# Patient Record
Sex: MALE | Race: WHITE | Employment: FULL TIME | URBAN - METROPOLITAN AREA
[De-identification: names, ages, dates, MRNs, and addresses within clinical notes are randomized per-mention and may not be internally consistent; named-entity substitution may affect disease eponyms.]

---

## 2019-01-16 ENCOUNTER — APPOINTMENT (OUTPATIENT)
Dept: CT IMAGING | Facility: CLINIC | Age: 49
End: 2019-01-16
Payer: COMMERCIAL

## 2019-01-16 ENCOUNTER — HOSPITAL ENCOUNTER (EMERGENCY)
Facility: CLINIC | Age: 49
Discharge: HOME | End: 2019-01-16
Attending: EMERGENCY MEDICINE | Admitting: EMERGENCY MEDICINE
Payer: COMMERCIAL

## 2019-01-16 VITALS
SYSTOLIC BLOOD PRESSURE: 117 MMHG | RESPIRATION RATE: 18 BRPM | HEART RATE: 77 BPM | HEIGHT: 68 IN | OXYGEN SATURATION: 99 % | TEMPERATURE: 98.4 F | WEIGHT: 195 LBS | BODY MASS INDEX: 29.55 KG/M2 | DIASTOLIC BLOOD PRESSURE: 78 MMHG

## 2019-01-16 DIAGNOSIS — S05.01XA ABRASION OF RIGHT CORNEA, INITIAL ENCOUNTER: ICD-10-CM

## 2019-01-16 LAB
ANION GAP SERPL CALCULATED.3IONS-SCNC: 13 MMOL/L (ref 3–14)
APTT PPP: 25 SEC (ref 22–37)
BASOPHILS # BLD AUTO: 0.1 10E9/L (ref 0–0.2)
BASOPHILS NFR BLD AUTO: 0.7 %
BUN SERPL-MCNC: 12 MG/DL (ref 7–30)
CALCIUM SERPL-MCNC: 9 MG/DL (ref 8.5–10.1)
CHLORIDE SERPL-SCNC: 105 MMOL/L (ref 94–109)
CO2 SERPL-SCNC: 24 MMOL/L (ref 20–32)
CREAT SERPL-MCNC: 1.12 MG/DL (ref 0.66–1.25)
DIFFERENTIAL METHOD BLD: ABNORMAL
EOSINOPHIL # BLD AUTO: 0.2 10E9/L (ref 0–0.7)
EOSINOPHIL NFR BLD AUTO: 2.4 %
ERYTHROCYTE [DISTWIDTH] IN BLOOD BY AUTOMATED COUNT: 12.3 % (ref 10–15)
GFR SERPL CREATININE-BSD FRML MDRD: 78 ML/MIN/{1.73_M2}
GLUCOSE SERPL-MCNC: 100 MG/DL (ref 70–99)
HCT VFR BLD AUTO: 50.1 % (ref 40–53)
HGB BLD-MCNC: 18.9 G/DL (ref 13.3–17.7)
IMM GRANULOCYTES # BLD: 0 10E9/L (ref 0–0.4)
IMM GRANULOCYTES NFR BLD: 0.4 %
INR PPP: 0.93 (ref 0.86–1.14)
INTERPRETATION ECG - MUSE: NORMAL
LYMPHOCYTES # BLD AUTO: 2.3 10E9/L (ref 0.8–5.3)
LYMPHOCYTES NFR BLD AUTO: 34.3 %
MCH RBC QN AUTO: 34.7 PG (ref 26.5–33)
MCHC RBC AUTO-ENTMCNC: 37.7 G/DL (ref 31.5–36.5)
MCV RBC AUTO: 92 FL (ref 78–100)
MONOCYTES # BLD AUTO: 0.4 10E9/L (ref 0–1.3)
MONOCYTES NFR BLD AUTO: 6.4 %
NEUTROPHILS # BLD AUTO: 3.8 10E9/L (ref 1.6–8.3)
NEUTROPHILS NFR BLD AUTO: 55.8 %
NRBC # BLD AUTO: 0 10*3/UL
NRBC BLD AUTO-RTO: 0 /100
PLATELET # BLD AUTO: 123 10E9/L (ref 150–450)
POTASSIUM SERPL-SCNC: 3.6 MMOL/L (ref 3.4–5.3)
RBC # BLD AUTO: 5.45 10E12/L (ref 4.4–5.9)
SODIUM SERPL-SCNC: 142 MMOL/L (ref 133–144)
WBC # BLD AUTO: 6.7 10E9/L (ref 4–11)

## 2019-01-16 PROCEDURE — 25000125 ZZHC RX 250: Performed by: EMERGENCY MEDICINE

## 2019-01-16 PROCEDURE — 80048 BASIC METABOLIC PNL TOTAL CA: CPT | Performed by: EMERGENCY MEDICINE

## 2019-01-16 PROCEDURE — 85610 PROTHROMBIN TIME: CPT | Performed by: EMERGENCY MEDICINE

## 2019-01-16 PROCEDURE — 85730 THROMBOPLASTIN TIME PARTIAL: CPT | Performed by: EMERGENCY MEDICINE

## 2019-01-16 PROCEDURE — 70498 CT ANGIOGRAPHY NECK: CPT

## 2019-01-16 PROCEDURE — 70450 CT HEAD/BRAIN W/O DYE: CPT

## 2019-01-16 PROCEDURE — 99285 EMERGENCY DEPT VISIT HI MDM: CPT | Mod: 25

## 2019-01-16 PROCEDURE — 0042T CT HEAD PERFUSION WITH CONTRAST: CPT

## 2019-01-16 PROCEDURE — 93005 ELECTROCARDIOGRAM TRACING: CPT

## 2019-01-16 PROCEDURE — 85025 COMPLETE CBC W/AUTO DIFF WBC: CPT | Performed by: EMERGENCY MEDICINE

## 2019-01-16 PROCEDURE — 25000128 H RX IP 250 OP 636: Performed by: EMERGENCY MEDICINE

## 2019-01-16 RX ORDER — LISINOPRIL 10 MG/1
TABLET ORAL DAILY
COMMUNITY
End: 2021-12-03

## 2019-01-16 RX ORDER — OFLOXACIN 3 MG/ML
1-2 SOLUTION/ DROPS OPHTHALMIC
Qty: 9 ML | Refills: 0 | Status: SHIPPED | OUTPATIENT
Start: 2019-01-16 | End: 2019-01-26

## 2019-01-16 RX ORDER — IOPAMIDOL 755 MG/ML
120 INJECTION, SOLUTION INTRAVASCULAR ONCE
Status: COMPLETED | OUTPATIENT
Start: 2019-01-16 | End: 2019-01-16

## 2019-01-16 RX ORDER — PROPARACAINE HYDROCHLORIDE 5 MG/ML
SOLUTION/ DROPS OPHTHALMIC
Status: DISCONTINUED
Start: 2019-01-16 | End: 2019-01-16 | Stop reason: HOSPADM

## 2019-01-16 RX ADMIN — SODIUM CHLORIDE 100 ML: 9 INJECTION, SOLUTION INTRAVENOUS at 20:48

## 2019-01-16 RX ADMIN — IOPAMIDOL 120 ML: 755 INJECTION, SOLUTION INTRAVENOUS at 20:48

## 2019-01-16 ASSESSMENT — ENCOUNTER SYMPTOMS
HEADACHES: 0
SPEECH DIFFICULTY: 0
EYE PAIN: 1
MYALGIAS: 0
WEAKNESS: 0

## 2019-01-16 ASSESSMENT — MIFFLIN-ST. JEOR: SCORE: 1739.01

## 2019-01-16 NOTE — ED AVS SNAPSHOT
Emergency Department  [address] [place] [address]  Phone:  [phone]  Fax:  [phone]                                    Chad Bolivar   MRN: [id number]    Department:   Emergency Department   Date of Visit:  1/16/2019           After Visit Summary Signature Page    I have received my discharge instructions, and my questions have been answered. I have discussed any challenges I see with this plan with the nurse or doctor.    ..........................................................................................................................................  Patient/Patient Representative Signature      ..........................................................................................................................................  Patient Representative Print Name and Relationship to Patient    ..................................................               ................................................  Date                                   Time    ..........................................................................................................................................  Reviewed by Signature/Title    ...................................................              ..............................................  Date                                               Time          22EPIC Rev 08/18

## 2019-01-17 NOTE — ED NOTES
"Pt went to sleep approx 1700, s/p \"having some drinks with some friends\". Pt woke up with blurry, \"decreased\" vision in right eye, as well as c/o eye irritation. Pt concerned for stroke, no other symptoms or neuro deficits. Pt had positive relief of eye pain/irritation s/p proparacaine to right eye by MD. MD noted small corneal abrasion during initial exam at bedside.   "

## 2019-01-17 NOTE — ED PROVIDER NOTES
History     Chief Complaint:  Loss of Vision    HPI   Chad Bolivar is a 48 year old male who presents to the Emergency Department today for evaluation of loss of vision in his right eye. Patient reports that he went out to have a drink with friends and then came home and took a nap around 5 or 5:30 PM. He says he felt normally at that time. When he woke up from his nap at 7 PM, he was nearly unable to see out of his right eye. He says he can see about 25% out of his right eye and that the whole field of vision is blurry. The eye is painful and it feels as though something is stuck in his eye. There is no curtain to his vision or flashing lights. His left eye is normal. He has no difficulty speaking. No issues with coordinated movement. No headache. No chest pain. No myalgias. He is not on blood thinners.     Allergies:  No known drug allergies    Medications:    Lisinopril  Aspirin 81 MG  Multivitamin  Celebrex  Ultram  Cialis  Inderal  Ativan  Depo-testosterone    Past Medical History:    Hypertension  Adjustment disorder with anxiety  Panic attack  Onychomycosis  Rosacea  Patellofemoral pain  Bradycardia  Alcoholism  Pancreatitis  Esophageal reflux  Hiatal hernia  Testosterone deficiency    Past Surgical History:    Oral surgery  Right inguinal hernia repair    Family History:    History reviewed. No pertinent family history.     Social History:  Smoking status: Never smoker  Alcohol use: Yes  Marital Status:  Legally  [3]     Review of Systems   Eyes: Positive for pain and visual disturbance.   Cardiovascular: Negative for chest pain.   Musculoskeletal: Negative for myalgias.   Neurological: Negative for speech difficulty, weakness and headaches.   All other systems reviewed and are negative.      Physical Exam     Patient Vitals for the past 24 hrs:   BP Temp Temp src Pulse Heart Rate Resp SpO2 Height Weight   01/16/19 2130 126/83 -- -- 77 76 20 94 % -- --   01/16/19 2115 140/87 -- -- 80 73 22 95 %  "-- --   01/16/19 2100 129/87 -- -- 76 81 19 94 % -- --   01/16/19 2058 136/85 -- -- 79 87 12 90 % -- --   01/16/19 2030 (!) 149/103 -- -- -- 96 28 (!) 89 % -- --   01/16/19 2026 (!) 149/103 98.4  F (36.9  C) Oral -- 100 -- 92 % 1.727 m (5' 8\") 88.5 kg (195 lb)       Physical Exam  Vitals: reviewed by me  General: Pt seen on [facility], cooperative, and alert to conversation  Eyes: Tracking well, clear conjunctiva BL  ENT: MMM, midline trachea.   Lungs:  No tachypnea, no accessory muscle use. No respiratory distress.   CV: Rate as above, regular rhythm.    Abd: Soft, non tender, no guarding, no rebound. Non distended  MSK: no peripheral edema or joint effusion.  No evidence of trauma  Skin: No rash, normal turgor and temperature  Neuro: Clear speech and no facial droop.  CN 2 - 12 in tact BL  BUE with SILT and 2PD in tact  BUE with 5/5 motor throughtout  BLE with SILT and 2PD in tact  BLE with 5/5 motor throughout  No clonus  BL EOMI  PERRLA BL  R conj inflammed  + Floro uptake over R cornea.  No ulcers   Psych: Not RIS, no e/o AH/VH      Emergency Department Course     ECG (20:34:15):  Rate 85 bpm. ME interval 190. QRS duration 114. QT/QTc 356/423. P-R-T axes * 216 171. Normal sinus rhythm. Lateral infarct, age undetermined. ST & T wave abnormality, consider inferior ischemia. Abnormal ECG. Interpreted at 2040 by Jasmeet Herrera MD.    Imaging:  Radiographic findings were communicated with the patient who voiced understanding of the findings.  CT Head w/o Contrast  IMPRESSION:  No evidence of acute intracranial hemorrhage, mass, or  herniation. As read by radiology.  CT Head Perfusion w Contrast  IMPRESSION:   1. Patent arteries in the head and neck without vascular cutoff. No  evidence of dissection. No aneurysm identified. No significant  stenosis.   2. Unremarkable CT perfusion images of the head. As read by radiology.  CTA Head Neck with Contrast  IMPRESSION:   1. Patent arteries in " the head and neck without vascular cutoff. No  evidence of dissection. No aneurysm identified. No significant  stenosis.   2. Unremarkable CT perfusion images of the head. As read by radiology.    Laboratory:  CBC: HGB 18.9 (H),  (L) o/w WNL (WBC 6.7)  BMP: Glucose 100 (H) o/w WNL (Creatinine 1.12)    PTT: 25  INR: 0.93    Emergency Department Course:  2022: Upon arriving to the ED, the patient was immediately placed in a critical care room.    2023: IV inserted and blood drawn. The patient was placed on continuous cardiac monitoring and pulse oximetry.    2031: I spoke with Neuro-radiology regarding this patient.    The patient was sent for a head CT, head CT perfusion, and CTA head neck while in the emergency department, findings above.    2041: I spoke with Dr. Nolan of Stroke Neurology regarding this patient.    2106: I spoke with Neuro-radiology regarding this patient.    2109: I spoke with Dr. Nolan of [facility] [place] [facility] regarding this patient.    2115: I rechecked the patient. Explained findings to the patient.    Findings and plan explained to the Patient. Patient discharged home with instructions regarding supportive care, medications, and reasons to return. The importance of close follow-up was reviewed.      Impression & Plan      Medical Decision Making:  Chad Bolivar is a 48 year old male who presents to the Emergency Room with vision loss in his right eye. Patient was initially stating he had painless vision loss, and then started to scratch his eye, which may have explained the corneal abrasion I saw on patient's arrival. As such, using shared decision making given patient's medical background, we did elect to call a formal code stroke as patient states he thinks that his visual loss was in fact painless at the onset. I see no evidence of amaurosis fugax, or other type of abnormality.  I do think this main issue is a corneal abrasion, possibly having been caused while asleep or with the use of  etoh. Patient has no visual field cuts, no evidence of cortical blindness, and eye pain immediately relieved with proparacaine. Will give antibiotic drops, very clear return to ED precautions, optho follow up in the next 48 hours, and patient appears pleased with his care. All questions answered, OK for discharge as above.    Diagnosis:    ICD-10-CM   1. Abrasion of right cornea, initial encounter S05.01XA     Disposition:  discharged to home    Discharge Medications:     Medication List      Started    ofloxacin 0.3 % ophthalmic solution  Commonly known as:  OCUFLOX  1-2 drops, Right Eye, EVERY 2 HOURS WHILE AWAKE          Emerald Pimentel  1/16/2019    EMERGENCY DEPARTMENT  Scribe Disclosure:  I, Emerald Pimentel, am serving as a scribe at 8:22 PM on 1/16/2019 to document services personally performed by Jasmeet Herrera MD based on my observations and the provider's statements to me.        Jasmeet Herrera MD  01/16/19 3273

## 2019-03-20 ENCOUNTER — NURSE TRIAGE (OUTPATIENT)
Dept: NURSING | Facility: CLINIC | Age: 49
End: 2019-03-20

## 2019-03-21 NOTE — TELEPHONE ENCOUNTER
Mother is calling and states she had to drive over to be with the kids because her son is passed out from drinking. Mother states it is difficult to arouse him, and he only opened his eyes a little bit once with stimuli. Mother states son has been in a detox program before and she does not know what to do. Mother states she has to stay with the kids when the ambulance arrives. Triage guidelines recommend to call 911. Caller verbalized and understands directives.    Reason for Disposition    Difficult to awaken or acting confused  (e.g., disoriented, slurred speech)    Additional Information    Negative: Coma (e.g., not moving, not talking, not responding to stimuli)    Protocols used: ALCOHOL ABUSE AND DEPENDENCE-ADULT-AH

## 2019-06-16 ENCOUNTER — HOSPITAL ENCOUNTER (EMERGENCY)
Facility: CLINIC | Age: 49
Discharge: HOME | End: 2019-06-16
Attending: EMERGENCY MEDICINE | Admitting: EMERGENCY MEDICINE
Payer: COMMERCIAL

## 2019-06-16 ENCOUNTER — APPOINTMENT (OUTPATIENT)
Dept: GENERAL RADIOLOGY | Facility: CLINIC | Age: 49
End: 2019-06-16
Attending: EMERGENCY MEDICINE
Payer: COMMERCIAL

## 2019-06-16 VITALS
HEART RATE: 105 BPM | BODY MASS INDEX: 28.88 KG/M2 | TEMPERATURE: 99.7 F | RESPIRATION RATE: 14 BRPM | DIASTOLIC BLOOD PRESSURE: 86 MMHG | OXYGEN SATURATION: 92 % | HEIGHT: 69 IN | WEIGHT: 195 LBS | SYSTOLIC BLOOD PRESSURE: 148 MMHG

## 2019-06-16 DIAGNOSIS — F10.930 ALCOHOL WITHDRAWAL SYNDROME WITHOUT COMPLICATION (H): ICD-10-CM

## 2019-06-16 LAB
ALBUMIN SERPL-MCNC: 4.4 G/DL (ref 3.4–5)
ALP SERPL-CCNC: 48 U/L (ref 40–150)
ALT SERPL W P-5'-P-CCNC: 96 U/L (ref 0–70)
ANION GAP SERPL CALCULATED.3IONS-SCNC: 20 MMOL/L (ref 3–14)
AST SERPL W P-5'-P-CCNC: 92 U/L (ref 0–45)
BASOPHILS # BLD AUTO: 0.1 10E9/L (ref 0–0.2)
BASOPHILS NFR BLD AUTO: 0.6 %
BILIRUB SERPL-MCNC: 1.1 MG/DL (ref 0.2–1.3)
BUN SERPL-MCNC: 15 MG/DL (ref 7–30)
CALCIUM SERPL-MCNC: 8.4 MG/DL (ref 8.5–10.1)
CHLORIDE SERPL-SCNC: 92 MMOL/L (ref 94–109)
CO2 SERPL-SCNC: 21 MMOL/L (ref 20–32)
CREAT SERPL-MCNC: 0.93 MG/DL (ref 0.66–1.25)
DIFFERENTIAL METHOD BLD: ABNORMAL
EOSINOPHIL # BLD AUTO: 0 10E9/L (ref 0–0.7)
EOSINOPHIL NFR BLD AUTO: 0 %
ERYTHROCYTE [DISTWIDTH] IN BLOOD BY AUTOMATED COUNT: 12.7 % (ref 10–15)
ETHANOL SERPL-MCNC: 0.12 G/DL
GFR SERPL CREATININE-BSD FRML MDRD: >90 ML/MIN/{1.73_M2}
GLUCOSE SERPL-MCNC: 86 MG/DL (ref 70–99)
HCT VFR BLD AUTO: 50.3 % (ref 40–53)
HGB BLD-MCNC: 19.2 G/DL (ref 13.3–17.7)
IMM GRANULOCYTES # BLD: 0 10E9/L (ref 0–0.4)
IMM GRANULOCYTES NFR BLD: 0.3 %
INTERPRETATION ECG - MUSE: NORMAL
LIPASE SERPL-CCNC: 111 U/L (ref 73–393)
LYMPHOCYTES # BLD AUTO: 0.9 10E9/L (ref 0.8–5.3)
LYMPHOCYTES NFR BLD AUTO: 9.9 %
MCH RBC QN AUTO: 35.2 PG (ref 26.5–33)
MCHC RBC AUTO-ENTMCNC: 37.8 G/DL (ref 31.5–36.5)
MCV RBC AUTO: 92 FL (ref 78–100)
MONOCYTES # BLD AUTO: 0.7 10E9/L (ref 0–1.3)
MONOCYTES NFR BLD AUTO: 8.3 %
NEUTROPHILS # BLD AUTO: 7.1 10E9/L (ref 1.6–8.3)
NEUTROPHILS NFR BLD AUTO: 80.9 %
NRBC # BLD AUTO: 0 10*3/UL
NRBC BLD AUTO-RTO: 0 /100
PLATELET # BLD AUTO: 110 10E9/L (ref 150–450)
POTASSIUM SERPL-SCNC: 4.5 MMOL/L (ref 3.4–5.3)
PROT SERPL-MCNC: 8.2 G/DL (ref 6.8–8.8)
RBC # BLD AUTO: 5.45 10E12/L (ref 4.4–5.9)
SODIUM SERPL-SCNC: 133 MMOL/L (ref 133–144)
TROPONIN I SERPL-MCNC: <0.015 UG/L (ref 0–0.04)
WBC # BLD AUTO: 8.8 10E9/L (ref 4–11)

## 2019-06-16 PROCEDURE — 25000125 ZZHC RX 250: Performed by: EMERGENCY MEDICINE

## 2019-06-16 PROCEDURE — 71046 X-RAY EXAM CHEST 2 VIEWS: CPT

## 2019-06-16 PROCEDURE — 96365 THER/PROPH/DIAG IV INF INIT: CPT

## 2019-06-16 PROCEDURE — 96376 TX/PRO/DX INJ SAME DRUG ADON: CPT

## 2019-06-16 PROCEDURE — 80320 DRUG SCREEN QUANTALCOHOLS: CPT | Performed by: EMERGENCY MEDICINE

## 2019-06-16 PROCEDURE — 99285 EMERGENCY DEPT VISIT HI MDM: CPT | Mod: 25

## 2019-06-16 PROCEDURE — 80053 COMPREHEN METABOLIC PANEL: CPT | Performed by: EMERGENCY MEDICINE

## 2019-06-16 PROCEDURE — 96375 TX/PRO/DX INJ NEW DRUG ADDON: CPT

## 2019-06-16 PROCEDURE — 96361 HYDRATE IV INFUSION ADD-ON: CPT

## 2019-06-16 PROCEDURE — 83690 ASSAY OF LIPASE: CPT | Performed by: EMERGENCY MEDICINE

## 2019-06-16 PROCEDURE — 25800030 ZZH RX IP 258 OP 636: Performed by: EMERGENCY MEDICINE

## 2019-06-16 PROCEDURE — 93005 ELECTROCARDIOGRAM TRACING: CPT

## 2019-06-16 PROCEDURE — 85025 COMPLETE CBC W/AUTO DIFF WBC: CPT | Performed by: EMERGENCY MEDICINE

## 2019-06-16 PROCEDURE — 84484 ASSAY OF TROPONIN QUANT: CPT | Performed by: EMERGENCY MEDICINE

## 2019-06-16 PROCEDURE — 25000128 H RX IP 250 OP 636: Performed by: EMERGENCY MEDICINE

## 2019-06-16 RX ORDER — ONDANSETRON 2 MG/ML
4 INJECTION INTRAMUSCULAR; INTRAVENOUS ONCE
Status: COMPLETED | OUTPATIENT
Start: 2019-06-16 | End: 2019-06-16

## 2019-06-16 RX ORDER — SODIUM CHLORIDE 9 MG/ML
1000 INJECTION, SOLUTION INTRAVENOUS CONTINUOUS
Status: DISCONTINUED | OUTPATIENT
Start: 2019-06-16 | End: 2019-06-16 | Stop reason: HOSPADM

## 2019-06-16 RX ORDER — LORAZEPAM 2 MG/ML
1 INJECTION INTRAMUSCULAR ONCE
Status: COMPLETED | OUTPATIENT
Start: 2019-06-16 | End: 2019-06-16

## 2019-06-16 RX ORDER — ONDANSETRON 4 MG/1
4 TABLET, ORALLY DISINTEGRATING ORAL EVERY 8 HOURS PRN
Qty: 10 TABLET | Refills: 0 | Status: SHIPPED | OUTPATIENT
Start: 2019-06-16 | End: 2019-06-19

## 2019-06-16 RX ORDER — LORAZEPAM 1 MG/1
1 TABLET ORAL EVERY 6 HOURS PRN
Qty: 8 TABLET | Refills: 0 | Status: SHIPPED | OUTPATIENT
Start: 2019-06-16 | End: 2021-12-03

## 2019-06-16 RX ORDER — LORAZEPAM 2 MG/ML
0.5 INJECTION INTRAMUSCULAR ONCE
Status: COMPLETED | OUTPATIENT
Start: 2019-06-16 | End: 2019-06-16

## 2019-06-16 RX ORDER — SODIUM CHLORIDE 9 MG/ML
INJECTION, SOLUTION INTRAVENOUS CONTINUOUS
Status: DISCONTINUED | OUTPATIENT
Start: 2019-06-16 | End: 2019-06-16 | Stop reason: HOSPADM

## 2019-06-16 RX ADMIN — SODIUM CHLORIDE: 9 INJECTION, SOLUTION INTRAVENOUS at 16:14

## 2019-06-16 RX ADMIN — ONDANSETRON 4 MG: 2 INJECTION INTRAMUSCULAR; INTRAVENOUS at 16:14

## 2019-06-16 RX ADMIN — FOLIC ACID: 5 INJECTION, SOLUTION INTRAMUSCULAR; INTRAVENOUS; SUBCUTANEOUS at 16:59

## 2019-06-16 RX ADMIN — LORAZEPAM 0.5 MG: 2 INJECTION INTRAMUSCULAR; INTRAVENOUS at 17:53

## 2019-06-16 RX ADMIN — LORAZEPAM 1 MG: 2 INJECTION INTRAMUSCULAR; INTRAVENOUS at 16:24

## 2019-06-16 ASSESSMENT — ENCOUNTER SYMPTOMS
LIGHT-HEADEDNESS: 1
ABDOMINAL PAIN: 0
FEVER: 1
VOMITING: 1
MYALGIAS: 1
NUMBNESS: 1
NAUSEA: 1
CHEST TIGHTNESS: 1
DIFFICULTY URINATING: 0

## 2019-06-16 ASSESSMENT — MIFFLIN-ST. JEOR: SCORE: 1749.89

## 2019-06-16 NOTE — ED TRIAGE NOTES
"Patient states he is in \"Acute alcohol withdrawal after a 3 day cowan.\" Patient states he drank 1.75ml of gin over the last 3 days. He is nauseated and having chest pain and left arm tingling.   "

## 2019-06-16 NOTE — ED AVS SNAPSHOT
Emergency Department  [address] [place] [address]  Phone:  [phone]  Fax:  [phone]                                    Chad Bolivar   MRN: [id number]    Department:   Emergency Department   Date of Visit:  6/16/2019           After Visit Summary Signature Page    I have received my discharge instructions, and my questions have been answered. I have discussed any challenges I see with this plan with the nurse or doctor.    ..........................................................................................................................................  Patient/Patient Representative Signature      ..........................................................................................................................................  Patient Representative Print Name and Relationship to Patient    ..................................................               ................................................  Date                                   Time    ..........................................................................................................................................  Reviewed by Signature/Title    ...................................................              ..............................................  Date                                               Time          22EPIC Rev 08/18       
pain, shoulder

## 2019-06-16 NOTE — ED PROVIDER NOTES
History     Chief Complaint:  Alcohol Intoxication and Palpitations.      CAMILA   Chad Bolivar is a 49 year old male who presents to the emergency department today for evaluation of alcohol intoxication and chest discomfort. 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 He has drank 1/2 a Liter of gin over the past 3 days. He has symptoms of nausea, chest pain, and left arm tingling. He was laying in bed when his heart started racing, his arm went numb, and he got lightheaded. His last drink was around midnight last night. He notes he rarely binge drinks. After the last episode, he had these symptoms, but he notes this time it is worse. He denies using recreational drugs. He denies being ill this past week. His wife notes that when he woke up he had a 102 fever on Friday morning that lasted that day, but he did not have any other symptoms. He does not have a general practitioner here. He goes to [place] or [place] for his primary care, his wife splits her time between here and there. He denies coughing, abdominal pain. He has thrown up today, due to the alcohol. He denies history of heart disease, arrythmia, pancreas, or liver issues in the past. He flies fairly regularly.  Of note the patient does have a history of alcohol abuse and was in treatment of a long time ago.    Allergies:  No Known Drug Allergies      Medications:    TESTOSTERONE CYPIONATE IM   lisinopril (PRINIVIL,ZESTRIL) 1 MG/ML suspension       Past Medical History:    Hypertension  Panic disorder  Alcoholism  GERD  Testosterone deficiency      Past Surgical History:    History reviewed. No pertinent surgical history.     Family History:    History reviewed. No pertinent family history.    Social History:  The patient was accompanied to the ED by his wife.  Smoking Status: no  Smokeless Tobacco: no  Alcohol Use: yes   Marital Status:   [2]     Review of Systems   Constitutional: Positive for  "fever.   Respiratory: Positive for chest tightness.    Cardiovascular: Positive for chest pain.   Gastrointestinal: Positive for nausea and vomiting. Negative for abdominal pain.   Genitourinary: Negative for difficulty urinating.   Musculoskeletal: Positive for myalgias (left arm).   Neurological: Positive for light-headedness and numbness.   All other systems reviewed and are negative.      Physical Exam     Patient Vitals for the past 24 hrs:   BP Temp Temp src Pulse Heart Rate Resp SpO2 Height Weight   06/16/19 1745 148/86 -- -- 105 -- -- 92 % -- --   06/16/19 1730 149/87 -- -- 105 -- -- 93 % -- --   06/16/19 1715 140/85 -- -- 103 -- -- 91 % -- --   06/16/19 1700 (!) 151/94 -- -- 101 -- -- 92 % -- --   06/16/19 1645 (!) 150/92 -- -- 98 -- -- 93 % -- --   06/16/19 1630 (!) 154/95 -- -- 98 -- -- 93 % -- --   06/16/19 1615 (!) 150/95 -- -- -- -- -- -- -- --   06/16/19 1605 -- -- -- -- 99 -- 95 % -- --   06/16/19 1600 (!) 152/102 -- -- 100 101 -- 93 % -- --   06/16/19 1555 (!) 153/98 -- -- -- 100 -- 92 % -- --   06/16/19 1550 -- -- -- 103 103 -- 96 % -- --   06/16/19 1545 (!) 157/105 -- -- -- -- -- -- -- --   06/16/19 1535 -- 99.7  F (37.6  C) Temporal -- -- -- -- -- --   06/16/19 1534 (!) 159/108 -- -- 125 -- 14 100 % 1.753 m (5' 9\") 88.5 kg (195 lb)        Physical Exam    Physical Exam   Constitutional:  Patient is oriented to person, place, and time. They appear well-developed and well-nourished. Mild distress secondary to alcohol intoxication.   HENT:   Mouth/Throat:   Oropharynx is clear and moist.   Eyes:    Conjunctivae normal and EOM are normal. Pupils are equal, round, and reactive to light. No icterus.  Neck:    Normal range of motion.   Cardiovascular: tachycardia but, regular rhythm and normal heart sounds.  Exam reveals no gallop and no friction rub.  No murmur heard.  Pulmonary/Chest:  Effort normal and breath sounds normal. Patient has no wheezes. Patient has no rales.   Abdominal:   Soft. Bowel " sounds are normal. Patient exhibits no mass. There is no tenderness. There is no rebound and no guarding.   Musculoskeletal:  Normal range of motion. Patient exhibits no edema.   Neurological:   Patient is alert and oriented to person, place, and time. Patient has normal strength. No cranial nerve deficit or sensory deficit. GCS 15  Skin:   Skin is warm and dry. No rash noted. No erythema.   Psychiatric:   Patient has a normal mood and affect. Patient's behavior is normal. Judgment and thought content normal.         Emergency Department Course     ECG (15:35:29):  Rate 124 bpm. AR interval 170. QRS duration 100. QT/QTc 284/408. P-R-T axes 67 150 26. Sinus tachycardia, right axis deviation, right ventricular hypertrophy, abnormal ECG. No significant change from ECG dated 1/16/19 Interpreted at 1537 by Marisol Barillas MD.     Imaging:  Radiographic findings were communicated with the patient who voiced understanding of the findings.    Chest XR  IMPRESSION: No active infiltrates.      Reading per radiology     Laboratory:  CBC: WNL (WBC 8.8, HGB 19.2 H,  L)     CMP: Chloride 92 L, Anion gap 20 H, calcium 8.4 L, ALT 96 H, AST 92 H (Creatinine 0.93)     Lipase: 111    Troponin (Collected 1640): <0.015    EtOH level: 0.12 H     Interventions:  1614 0.9% NaCl 1L IV Bolus   1614 Zofran 4mg IV injection  1620 Lorazepam 1 mg IV    1659 Dextrose 5%, Thiamine 100 mg, Folic acid 1 mg  1753 Lorazepam, 0.5 mg, IV injection    Emergency Department Course:  Past medical records, nursing notes, and vitals reviewed.  1606: I performed an exam of the patient and obtained history, as documented above.   IV inserted and blood drawn.   1747: I rechecked the patient. Findings and plan explained to the Patient. Patient discharged home with instructions regarding supportive care, medications, and reasons to return. The importance of close follow-up was reviewed.           Impression & Plan      Medical Decision  Making:  Chad Bolivar is a 49-year-old gentleman with a history of alcohol abuse presenting with concerns for alcohol withdrawal.  He was not clinically intoxicated on my exam but he was hypertensive and tachycardic.  He received IV fluids which consisted of normal saline as well as a banana bag.  He also received IV Ativan which improved his symptoms.  He received Zofran has not had any vomiting here.  EKG shows no evidence of ischemia.  He does have a mild sinus tachycardia.  His cardiac enzymes within normal limits.  His hemoglobin is a little elevated in the to be due to a little hemoconcentration.  His liver function is also elevated and this is likely secondary to alcohol abuse.  His platelets are chronically low again I suspect secondary to his alcohol use.  He was made aware of these findings.  His lipase is within normal limits.  His chest x-ray shows no evidence of pneumothorax, pneumomediastinum, mass, infiltrate, abnormal mediastinum.  I suspect his palpitations is secondary to his mild alcohol withdrawal.  He has no signs of DTs or altered mental status.    At this time I feel he is safe for discharge.  He is declining any chemical dependency treatment at this time.  He would like a mental health referral so I wrote an outpatient discharge order.  I wrote him a very limited supply of Ativan and he is aware he should not drive or use alcohol with this medication.  I also wrote him for Zofran.  He was given a primary care referral as his primary care doctor is out in the [facility] system because his wife works for [employer].  I feel he would benefit from a local practitioner.    Diagnosis:    ICD-10-CM    1. Alcohol withdrawal syndrome without complication (H) F10.230 MENTAL HEALTH REFERRAL  - Adult; Outpatient Treatment; Behavioral Health Home; Other: Not Listed - Enter Referral Details in Scheduling Comments Below       Disposition:  discharged to home    Discharge Medications:     Medication List       Started    LORazepam 1 MG tablet  Commonly known as:  ATIVAN  1 mg, Oral, EVERY 6 HOURS PRN     ondansetron 4 MG ODT tab  Commonly known as:  ZOFRAN ODT  4 mg, Oral, EVERY 8 HOURS PRN              Korin Henderson  6/16/2019    EMERGENCY DEPARTMENT  Scribe Disclosure:  I, Korin Henderson, am serving as a scribe at 4:06 PM on 6/16/2019 to document services personally performed by Marisol Barillas MD based on my observations and the provider's statements to me.       Marisol Barillas MD  06/16/19 1921

## 2019-10-02 ENCOUNTER — HEALTH MAINTENANCE LETTER (OUTPATIENT)
Age: 49
End: 2019-10-02

## 2020-10-28 ENCOUNTER — APPOINTMENT (OUTPATIENT)
Dept: MRI IMAGING | Facility: CLINIC | Age: 50
End: 2020-10-28
Attending: EMERGENCY MEDICINE
Payer: COMMERCIAL

## 2020-10-28 ENCOUNTER — HOSPITAL ENCOUNTER (EMERGENCY)
Facility: CLINIC | Age: 50
Discharge: HOME | End: 2020-10-28
Attending: EMERGENCY MEDICINE | Admitting: EMERGENCY MEDICINE
Payer: COMMERCIAL

## 2020-10-28 VITALS
HEART RATE: 62 BPM | RESPIRATION RATE: 15 BRPM | SYSTOLIC BLOOD PRESSURE: 147 MMHG | OXYGEN SATURATION: 91 % | TEMPERATURE: 97.9 F | HEIGHT: 69 IN | DIASTOLIC BLOOD PRESSURE: 90 MMHG | BODY MASS INDEX: 28.88 KG/M2 | WEIGHT: 195 LBS

## 2020-10-28 DIAGNOSIS — M54.12 CERVICAL RADICULOPATHY: ICD-10-CM

## 2020-10-28 PROCEDURE — 99284 EMERGENCY DEPT VISIT MOD MDM: CPT | Mod: 25

## 2020-10-28 PROCEDURE — 72141 MRI NECK SPINE W/O DYE: CPT

## 2020-10-28 PROCEDURE — 250N000013 HC RX MED GY IP 250 OP 250 PS 637: Performed by: EMERGENCY MEDICINE

## 2020-10-28 RX ORDER — OXYCODONE HYDROCHLORIDE 5 MG/1
5 TABLET ORAL EVERY 6 HOURS PRN
Qty: 14 TABLET | Refills: 0 | Status: SHIPPED | OUTPATIENT
Start: 2020-10-28 | End: 2021-12-03

## 2020-10-28 RX ORDER — OXYCODONE HYDROCHLORIDE 5 MG/1
5 TABLET ORAL ONCE
Status: COMPLETED | OUTPATIENT
Start: 2020-10-28 | End: 2020-10-28

## 2020-10-28 RX ADMIN — OXYCODONE HYDROCHLORIDE 5 MG: 5 TABLET ORAL at 20:15

## 2020-10-28 ASSESSMENT — ENCOUNTER SYMPTOMS
NECK PAIN: 1
ARTHRALGIAS: 1
NUMBNESS: 1

## 2020-10-28 ASSESSMENT — MIFFLIN-ST. JEOR: SCORE: 1744.89

## 2020-10-28 NOTE — ED AVS SNAPSHOT
[facility] Emergency Dept  [address] [facility] [address] [address]  Phone: [phone]  Fax: [phone]                                    Chad Bolivar   MRN: [id number]    Department: [facility] Emergency Dept   Date of Visit: 10/28/2020           After Visit Summary Signature Page    I have received my discharge instructions, and my questions have been answered. I have discussed any challenges I see with this plan with the nurse or doctor.    ..........................................................................................................................................  Patient/Patient Representative Signature      ..........................................................................................................................................  Patient Representative Print Name and Relationship to Patient    ..................................................               ................................................  Date                                   Time    ..........................................................................................................................................  Reviewed by Signature/Title    ...................................................              ..............................................  Date                                               Time          22EPIC Rev 08/18

## 2020-10-28 NOTE — ED TRIAGE NOTES
Non traumatic right neck pain for last few weeks, had virtual visit and been on Prednisone for 2 weeks, prednisone helped with the pain but pain returned soon after the medications was done on Monday.

## 2020-10-29 NOTE — ED PROVIDER NOTES
History   Chief Complaint  Neck Pain    HPI   Chad Bolivar is a 50 year old male who presents for evaluation of neck pain. The patient reports that he first started experiencing right shoulder pain about a month ago. This pain eventually spread across his neck and left shoulder, as well as beginning to shoot down his right arm causing numbness and tingling. The patient has been seen by a PCP and was started on prednisone 80 mg for two weeks, gabapentin 600 mg three times a day, diclo gel, and tylenol. The patient finished his prednisone 4 days ago and his symptoms went away at that time; however, the next day, his pain returned. Over the past three 3 he has been using naproxen along with his gabapentin, diclo gel, and tylenol. After three virtual doctor's appointments in the past three days, he was advised to come into the ED for an MRI. Here, the patient reports that if he moves his neck, he has a severe shooting pain down his right arm. He notes that it is pain on the lateral aspect of his hand/arm, while it is more of a numbness to the anterior aspect. The patient also notes generalized weakness in that arm. 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 Denies any history of neck surgeries. Of note, the patient also reports that his healthcare is through [employer] who does not cover the state of [place].     Allergies  No Known Allergies    Medications  Ativan  Lisinopril  Testosterone Cypionate    Past Medical History  Hypertension  Adjustment disorder with anxiety  Alcoholism  GERD  Low testosterone    Past Surgical History  Orthopedic surgery    Family History  Heart disease    Social History  Tobacco use: no  Alcohol use: yes  Drug use: no  Marital Status:   Occupation: Emergency PA at the [employer]     Review of Systems  "  Musculoskeletal: Positive for arthralgias and neck pain.   Neurological: Positive for numbness.   All other systems reviewed and are negative.    Physical Exam     Patient Vitals for the past 24 hrs:   BP Temp Temp src Pulse Resp SpO2 Height Weight   10/28/20 2130 -- -- -- -- -- 95 % -- --   10/28/20 2115 (!) 147/90 -- -- 62 -- 94 % -- --   10/28/20 2111 (!) 147/90 -- -- 63 15 95 % -- --   10/28/20 1945 133/84 -- -- 65 -- 96 % -- --   10/28/20 1839 (!) 163/91 97.9  F (36.6  C) Oral 70 16 98 % 1.753 m (5' 9\") 88.5 kg (195 lb)     Physical Exam  GENERAL: well developed, pleasant  HEAD: atraumatic  EYES: pupils reactive, extraocular muscles intact, conjunctivae normal  ENT:  mucus membranes moist  NECK:  trachea midline, normal range of motion  RESPIRATORY: no tachypnea, breath sounds clear to auscultation   CVS: normal S1/S2, no murmurs, intact distal pulses  ABDOMEN: soft, nontender, nondistention  MUSCULOSKELETAL: no deformities. 5/5  strength bilaterally. 4/5 biceps and triceps on the right.  SKIN: warm and dry, no acute rashes or ulceration  NEURO: GCS 15, cranial nerves intact, alert and oriented x3  PSYCH:  Mood/affect normal    Emergency Department Course   Imaging:  Radiology findings were communicated with the patient who voiced understanding of the findings.    MR Cervical Spine w/o contrast:   MPRESSION:   Multilevel degenerative disc disease and facet and uncovertebral arthropathy of the cervical spine, as described. Please see the body of the report for details, including level-by-level findings.    FINDINGS:   Normal vertebral body heights and alignment. Unremarkable bone marrow signal for the patient's age. No destructive focal marrow lesion seen. No cord signal abnormality. Multiple presumed calcified palatine tonsillitis. The visualized paraspinous soft tissues are unremarkable.     Segmental analysis:   Craniocervical junction/C1-C2: Mild degenerative arthropathy at median atlantoaxial joint. " Otherwise normal.     C2-C3: Normal disc height. No significant disc bulge or herniation. Mild to moderate right and mild left facet arthropathy. No spinal canal stenosis. No significant neural foraminal stenosis.     C3-C4: Normal disc height. There is a diffuse disc bulge with superimposed central disc protrusion, right more than left uncovertebral arthropathy, and moderate right and mild left facet arthropathy. Mild to moderate spinal canal stenosis with mild mass effect on the spinal cord. Moderate to severe right neural foraminal stenosis with apparent significant mass effect on the exiting right C4 nerve root. Mild left neural foraminal stenosis.     C4-C5: Normal disc height. There is a disc bulge eccentric to the right with right more than left uncovertebral arthropathy and severe right and mild left facet arthropathy. No significant spinal stenosis. Moderate right neural foraminal stenosis. The left neural foramen is not significantly narrowed.     C5-C6: Normal disc height. Shallow symmetric disc bulge. Moderate to severe right facet arthropathy. The left facet joint appears within normal limits. No spinal canal stenosis. Mild-to-moderate bilateral neural foraminal stenosis.     C6-C7: Normal disc height. No significant disc bulge or herniation. Moderate right and mild left facet arthropathy. No spinal canal stenosis. There appears to be moderate right neural foraminal stenosis, although there is some artifact in the region of the right neural foramen that makes it somewhat challenging to evaluate. No significant left neural foraminal stenosis.     C7-T1: Normal disc height. Shallow symmetric disc bulging with mild bilateral uncovertebral and facet arthropathy. No significant spinal canal or neural foraminal stenosis.     As per radiology     Interventions:  2015 Roxicodone 5 mg PO    Emergency Department Course:  Past medical records, nursing notes, and vitals reviewed.    1930 I physically examined the  patient as documented above.     The patient was sent for radiographs while in the emergency department, results above.     2125 I consulted with Dr. Stubbs, on call orthopedic surgeon, regarding the patient's history and presentation here in the emergency department.     I rechecked the patient and discussed the findings of their workup thus far.     Findings and plan explained to the Patient. Patient discharged home with instructions regarding supportive care, medications, and reasons to return. The importance of close follow-up was reviewed. The patient was prescribed Roxicodone.     I personally reviewed the laboratory and imaging results with the Patient and answered all related questions prior to discharge.     Impression & Plan   Medical Decision Making:  Patient presents with neck pain that radiates down his right arm and now noticing weakness and numbness.  He notes improvement of symptoms with prednisone and was on a 2-week tapering dose starting at 80 mg and tapering down.  After he stopped the prednisone pain came back with a vengeance.  MRI shows findings as above.  Spoke with neurosurgery Dr. Stubbs and patient be followed up in the near future.  Discussed pain control with him and precautions including sedation, not mixing with alcohol, constipation and addiction.    Diagnosis:    ICD-10-CM    1. Cervical radiculopathy  M54.12      Disposition:  Discharged to home.    Discharge Medications:  New Prescriptions    OXYCODONE (ROXICODONE) 5 MG TABLET    Take 1 tablet (5 mg) by mouth every 6 hours as needed for pain       Scribe Disclosure:  I, Prabhu Bolivar, am serving as a scribe at 7:13 PM on 10/28/2020 to document services personally performed by Nahum Esteves MD based on my observations and the provider's statements to me.      Nahum Esteves MD  10/28/20 2864

## 2021-01-15 ENCOUNTER — HEALTH MAINTENANCE LETTER (OUTPATIENT)
Age: 51
End: 2021-01-15

## 2021-05-31 ENCOUNTER — RECORDS - HEALTHEAST (OUTPATIENT)
Dept: ADMINISTRATIVE | Facility: CLINIC | Age: 51
End: 2021-05-31

## 2021-09-04 ENCOUNTER — HEALTH MAINTENANCE LETTER (OUTPATIENT)
Age: 51
End: 2021-09-04

## 2021-12-03 ENCOUNTER — APPOINTMENT (OUTPATIENT)
Dept: CT IMAGING | Facility: CLINIC | Age: 51
DRG: 122 | End: 2021-12-03
Attending: EMERGENCY MEDICINE
Payer: COMMERCIAL

## 2021-12-03 ENCOUNTER — TELEPHONE (OUTPATIENT)
Dept: OPHTHALMOLOGY | Facility: CLINIC | Age: 51
End: 2021-12-03

## 2021-12-03 ENCOUNTER — HOSPITAL ENCOUNTER (INPATIENT)
Facility: CLINIC | Age: 51
LOS: 4 days | Discharge: HOME | DRG: 122 | End: 2021-12-07
Attending: EMERGENCY MEDICINE | Admitting: HOSPITALIST
Payer: COMMERCIAL

## 2021-12-03 DIAGNOSIS — H05.012 ORBITAL CELLULITIS ON LEFT: ICD-10-CM

## 2021-12-03 LAB
ANION GAP SERPL CALCULATED.3IONS-SCNC: 4 MMOL/L (ref 3–14)
BASOPHILS # BLD AUTO: 0.1 10E3/UL (ref 0–0.2)
BASOPHILS NFR BLD AUTO: 1 %
BUN SERPL-MCNC: 13 MG/DL (ref 7–30)
CALCIUM SERPL-MCNC: 9.2 MG/DL (ref 8.5–10.1)
CHLORIDE BLD-SCNC: 107 MMOL/L (ref 94–109)
CO2 SERPL-SCNC: 26 MMOL/L (ref 20–32)
CREAT SERPL-MCNC: 0.98 MG/DL (ref 0.66–1.25)
EOSINOPHIL # BLD AUTO: 0.2 10E3/UL (ref 0–0.7)
EOSINOPHIL NFR BLD AUTO: 3 %
ERYTHROCYTE [DISTWIDTH] IN BLOOD BY AUTOMATED COUNT: 12.6 % (ref 10–15)
GFR SERPL CREATININE-BSD FRML MDRD: 90 ML/MIN/1.73M2
GLUCOSE BLD-MCNC: 112 MG/DL (ref 70–99)
HCT VFR BLD AUTO: 44.2 % (ref 40–53)
HGB BLD-MCNC: 16.3 G/DL (ref 13.3–17.7)
HOLD SPECIMEN: NORMAL
IMM GRANULOCYTES # BLD: 0 10E3/UL
IMM GRANULOCYTES NFR BLD: 1 %
LYMPHOCYTES # BLD AUTO: 1.5 10E3/UL (ref 0.8–5.3)
LYMPHOCYTES NFR BLD AUTO: 20 %
MCH RBC QN AUTO: 30.9 PG (ref 26.5–33)
MCHC RBC AUTO-ENTMCNC: 36.9 G/DL (ref 31.5–36.5)
MCV RBC AUTO: 84 FL (ref 78–100)
MONOCYTES # BLD AUTO: 0.5 10E3/UL (ref 0–1.3)
MONOCYTES NFR BLD AUTO: 6 %
NEUTROPHILS # BLD AUTO: 5.3 10E3/UL (ref 1.6–8.3)
NEUTROPHILS NFR BLD AUTO: 69 %
NRBC # BLD AUTO: 0 10E3/UL
NRBC BLD AUTO-RTO: 0 /100
PLATELET # BLD AUTO: 157 10E3/UL (ref 150–450)
POTASSIUM BLD-SCNC: 3.9 MMOL/L (ref 3.4–5.3)
RBC # BLD AUTO: 5.27 10E6/UL (ref 4.4–5.9)
SARS-COV-2 RNA RESP QL NAA+PROBE: NEGATIVE
SODIUM SERPL-SCNC: 137 MMOL/L (ref 133–144)
WBC # BLD AUTO: 7.6 10E3/UL (ref 4–11)

## 2021-12-03 PROCEDURE — 258N000003 HC RX IP 258 OP 636: Performed by: EMERGENCY MEDICINE

## 2021-12-03 PROCEDURE — 36415 COLL VENOUS BLD VENIPUNCTURE: CPT | Performed by: EMERGENCY MEDICINE

## 2021-12-03 PROCEDURE — 250N000011 HC RX IP 250 OP 636: Performed by: EMERGENCY MEDICINE

## 2021-12-03 PROCEDURE — 87635 SARS-COV-2 COVID-19 AMP PRB: CPT | Performed by: EMERGENCY MEDICINE

## 2021-12-03 PROCEDURE — 250N000013 HC RX MED GY IP 250 OP 250 PS 637: Performed by: EMERGENCY MEDICINE

## 2021-12-03 PROCEDURE — 80048 BASIC METABOLIC PNL TOTAL CA: CPT | Performed by: EMERGENCY MEDICINE

## 2021-12-03 PROCEDURE — 250N000013 HC RX MED GY IP 250 OP 250 PS 637: Performed by: HOSPITALIST

## 2021-12-03 PROCEDURE — 85004 AUTOMATED DIFF WBC COUNT: CPT | Performed by: EMERGENCY MEDICINE

## 2021-12-03 PROCEDURE — 250N000011 HC RX IP 250 OP 636: Performed by: HOSPITALIST

## 2021-12-03 PROCEDURE — 120N000001 HC R&B MED SURG/OB

## 2021-12-03 PROCEDURE — 96365 THER/PROPH/DIAG IV INF INIT: CPT

## 2021-12-03 PROCEDURE — 70481 CT ORBIT/EAR/FOSSA W/DYE: CPT

## 2021-12-03 PROCEDURE — 99223 1ST HOSP IP/OBS HIGH 75: CPT | Mod: AI | Performed by: HOSPITALIST

## 2021-12-03 PROCEDURE — C9803 HOPD COVID-19 SPEC COLLECT: HCPCS

## 2021-12-03 PROCEDURE — 250N000009 HC RX 250: Performed by: EMERGENCY MEDICINE

## 2021-12-03 PROCEDURE — 99285 EMERGENCY DEPT VISIT HI MDM: CPT | Mod: 25

## 2021-12-03 PROCEDURE — 250N000013 HC RX MED GY IP 250 OP 250 PS 637: Performed by: NURSE PRACTITIONER

## 2021-12-03 RX ORDER — POLYETHYLENE GLYCOL 3350 17 G/17G
17 POWDER, FOR SOLUTION ORAL DAILY PRN
Status: DISCONTINUED | OUTPATIENT
Start: 2021-12-03 | End: 2021-12-07 | Stop reason: HOSPADM

## 2021-12-03 RX ORDER — AMPICILLIN AND SULBACTAM 2; 1 G/1; G/1
3 INJECTION, POWDER, FOR SOLUTION INTRAMUSCULAR; INTRAVENOUS EVERY 6 HOURS
Status: DISCONTINUED | OUTPATIENT
Start: 2021-12-03 | End: 2021-12-07 | Stop reason: HOSPADM

## 2021-12-03 RX ORDER — LIDOCAINE 40 MG/G
CREAM TOPICAL
Status: DISCONTINUED | OUTPATIENT
Start: 2021-12-03 | End: 2021-12-07 | Stop reason: HOSPADM

## 2021-12-03 RX ORDER — PROCHLORPERAZINE 25 MG
25 SUPPOSITORY, RECTAL RECTAL EVERY 12 HOURS PRN
Status: DISCONTINUED | OUTPATIENT
Start: 2021-12-03 | End: 2021-12-07 | Stop reason: HOSPADM

## 2021-12-03 RX ORDER — VANCOMYCIN HYDROCHLORIDE 1 G/200ML
1000 INJECTION, SOLUTION INTRAVENOUS ONCE
Status: DISCONTINUED | OUTPATIENT
Start: 2021-12-03 | End: 2021-12-03 | Stop reason: DRUGHIGH

## 2021-12-03 RX ORDER — LIDOCAINE 40 MG/G
CREAM TOPICAL
Status: CANCELLED | OUTPATIENT
Start: 2021-12-03

## 2021-12-03 RX ORDER — TESTOSTERONE CYPIONATE 200 MG/ML
200 INJECTION, SOLUTION INTRAMUSCULAR
COMMUNITY

## 2021-12-03 RX ORDER — IOPAMIDOL 755 MG/ML
50 INJECTION, SOLUTION INTRAVASCULAR ONCE
Status: COMPLETED | OUTPATIENT
Start: 2021-12-03 | End: 2021-12-03

## 2021-12-03 RX ORDER — AMPICILLIN AND SULBACTAM 2; 1 G/1; G/1
3 INJECTION, POWDER, FOR SOLUTION INTRAMUSCULAR; INTRAVENOUS ONCE
Status: COMPLETED | OUTPATIENT
Start: 2021-12-03 | End: 2021-12-03

## 2021-12-03 RX ORDER — ACETAMINOPHEN 325 MG/1
650 TABLET ORAL EVERY 6 HOURS PRN
Status: DISCONTINUED | OUTPATIENT
Start: 2021-12-03 | End: 2021-12-07 | Stop reason: HOSPADM

## 2021-12-03 RX ORDER — VANCOMYCIN HYDROCHLORIDE 1 G/200ML
1000 INJECTION, SOLUTION INTRAVENOUS EVERY 12 HOURS
Status: DISCONTINUED | OUTPATIENT
Start: 2021-12-04 | End: 2021-12-07 | Stop reason: HOSPADM

## 2021-12-03 RX ORDER — OXYCODONE HYDROCHLORIDE 5 MG/1
5 TABLET ORAL EVERY 4 HOURS PRN
Status: DISCONTINUED | OUTPATIENT
Start: 2021-12-03 | End: 2021-12-07 | Stop reason: HOSPADM

## 2021-12-03 RX ORDER — PANTOPRAZOLE SODIUM 40 MG/1
40 TABLET, DELAYED RELEASE ORAL
Status: DISCONTINUED | OUTPATIENT
Start: 2021-12-04 | End: 2021-12-07 | Stop reason: HOSPADM

## 2021-12-03 RX ORDER — AMOXICILLIN 250 MG
2 CAPSULE ORAL 2 TIMES DAILY PRN
Status: DISCONTINUED | OUTPATIENT
Start: 2021-12-03 | End: 2021-12-07 | Stop reason: HOSPADM

## 2021-12-03 RX ORDER — ONDANSETRON 2 MG/ML
4 INJECTION INTRAMUSCULAR; INTRAVENOUS EVERY 6 HOURS PRN
Status: DISCONTINUED | OUTPATIENT
Start: 2021-12-03 | End: 2021-12-07 | Stop reason: HOSPADM

## 2021-12-03 RX ORDER — ACETAMINOPHEN 650 MG/1
650 SUPPOSITORY RECTAL EVERY 6 HOURS PRN
Status: DISCONTINUED | OUTPATIENT
Start: 2021-12-03 | End: 2021-12-07 | Stop reason: HOSPADM

## 2021-12-03 RX ORDER — ONDANSETRON 4 MG/1
4 TABLET, ORALLY DISINTEGRATING ORAL EVERY 6 HOURS PRN
Status: DISCONTINUED | OUTPATIENT
Start: 2021-12-03 | End: 2021-12-07 | Stop reason: HOSPADM

## 2021-12-03 RX ORDER — HYDROCODONE BITARTRATE AND ACETAMINOPHEN 5; 325 MG/1; MG/1
1 TABLET ORAL ONCE
Status: COMPLETED | OUTPATIENT
Start: 2021-12-03 | End: 2021-12-03

## 2021-12-03 RX ORDER — CALCIUM CARBONATE 500 MG/1
1000 TABLET, CHEWABLE ORAL 3 TIMES DAILY PRN
Status: DISCONTINUED | OUTPATIENT
Start: 2021-12-03 | End: 2021-12-07 | Stop reason: HOSPADM

## 2021-12-03 RX ORDER — AMOXICILLIN 250 MG
1 CAPSULE ORAL 2 TIMES DAILY PRN
Status: DISCONTINUED | OUTPATIENT
Start: 2021-12-03 | End: 2021-12-07 | Stop reason: HOSPADM

## 2021-12-03 RX ORDER — TADALAFIL 5 MG/1
5 TABLET ORAL DAILY PRN
COMMUNITY

## 2021-12-03 RX ORDER — PROCHLORPERAZINE MALEATE 10 MG
10 TABLET ORAL EVERY 6 HOURS PRN
Status: DISCONTINUED | OUTPATIENT
Start: 2021-12-03 | End: 2021-12-07 | Stop reason: HOSPADM

## 2021-12-03 RX ADMIN — AMPICILLIN SODIUM AND SULBACTAM SODIUM 3 G: 2; 1 INJECTION, POWDER, FOR SOLUTION INTRAMUSCULAR; INTRAVENOUS at 12:32

## 2021-12-03 RX ADMIN — HYDROCODONE BITARTRATE AND ACETAMINOPHEN 1 TABLET: 5; 325 TABLET ORAL at 13:15

## 2021-12-03 RX ADMIN — OXYCODONE HYDROCHLORIDE 5 MG: 5 TABLET ORAL at 17:06

## 2021-12-03 RX ADMIN — SODIUM CHLORIDE 60 ML: 900 INJECTION INTRAVENOUS at 11:54

## 2021-12-03 RX ADMIN — MELATONIN 5 MG TABLET 5 MG: at 21:42

## 2021-12-03 RX ADMIN — OXYCODONE HYDROCHLORIDE 5 MG: 5 TABLET ORAL at 21:37

## 2021-12-03 RX ADMIN — VANCOMYCIN HYDROCHLORIDE 2000 MG: 5 INJECTION, POWDER, LYOPHILIZED, FOR SOLUTION INTRAVENOUS at 13:16

## 2021-12-03 RX ADMIN — CALCIUM CARBONATE (ANTACID) CHEW TAB 500 MG 1000 MG: 500 CHEW TAB at 20:19

## 2021-12-03 RX ADMIN — IOPAMIDOL 50 ML: 755 INJECTION, SOLUTION INTRAVENOUS at 11:55

## 2021-12-03 RX ADMIN — AMPICILLIN SODIUM AND SULBACTAM SODIUM 3 G: 2; 1 INJECTION, POWDER, FOR SOLUTION INTRAMUSCULAR; INTRAVENOUS at 17:07

## 2021-12-03 ASSESSMENT — ACTIVITIES OF DAILY LIVING (ADL)
ADLS_ACUITY_SCORE: 12
ADLS_ACUITY_SCORE: 3
ADLS_ACUITY_SCORE: 12
ADLS_ACUITY_SCORE: 12
ADLS_ACUITY_SCORE: 3
ADLS_ACUITY_SCORE: 3
ADLS_ACUITY_SCORE: 12
ADLS_ACUITY_SCORE: 3
ADLS_ACUITY_SCORE: 12

## 2021-12-03 ASSESSMENT — MIFFLIN-ST. JEOR: SCORE: 1739.89

## 2021-12-03 ASSESSMENT — ENCOUNTER SYMPTOMS
HEADACHES: 1
NAUSEA: 0
VOMITING: 0
EYE PAIN: 1
FEVER: 0

## 2021-12-03 NOTE — H&P
History and Physical - Hospitalist Service       Date of Admission:  12/3/2021    Assessment & Plan      Chad Bolivar is a 51 year old male admitted on 12/3/2021.  Past history of GERD who presents with headache, left orbital pain and swelling, found to have left orbital cellulitis.       Left orbital cellulitis  Presents with above symptoms.  Reporting area of vision loss in left visual field as well as double vision.  Afebrile without leukocytosis though exam consistent with periorbital cellulitis.  CT orbit consistent with preseptal and postseptal left orbital cellulitis.    - ED discussed with ophthalmology at [facility] - did not recommend transfer for evaluation - recommend monitoring visual acuity and color vision and contact them if symptoms worsening or not improving   - continue vancomycin and Unasyn  - ID consult  - monitor visual acuity and color vision q4h    GERD  - continue PTA PPI       Diet:   Regular diet   DVT Prophylaxis: Pneumatic Compression Devices  [place] Catheter: Not present  Central Lines: None  Code Status:   Full code per patient     Clinically Significant Risk Factors Present on Admission                   Disposition Plan   Expected discharge:  3 days recommended to prior living arrangement once antibiotic plan established and vision improved.     The patient's care was discussed with the Patient.    Bradford Villa MD    Securely message with the [employer] Web Console (learn more here)  Text page via [employer] Paging/Directory        ______________________________________________________________________    Chief Complaint   Left eye pain     History is obtained from the patient, chart review and discussion with ED provider     History of Present Illness   Chad Bolivar is a 51 year old male who presents with left eye pain and swelling as well as visual changes.  He reports noting pain behind his left eye on Wednesday,  suspected it may be a migraine, though he has no history of this.   He took some tylenol.  On Thursday he noted swelling below the eye and ongoing pain and tried some warm compresses and more tylenol.  This morning he had onset of double vision, increased pain as well as increased swelling.  He developed a black spot in his left visual field about the 11 o'clock position, reporting this is bout the size of his fist.  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  He denies any fever/chills, sweats, nausea, lightheadedness or other systemic symptoms.      Review of Systems    The 10 point Review of Systems is negative other than noted in the HPI or here.     Past Medical History    I have reviewed this patient's medical history and updated it with pertinent information if needed.   Past Medical History:   Diagnosis Date     Gastroesophageal reflux disease without esophagitis      Hypertension        Past Surgical History   I have reviewed this patient's surgical history and updated it with pertinent information if needed.  Past Surgical History:   Procedure Laterality Date     ORTHOPEDIC SURGERY Right     ACL repair       Social History   I have reviewed this patient's social history and updated it with pertinent information if needed.  Social History     Tobacco Use     Smoking status: Never Smoker     Smokeless tobacco: Never Used   Substance Use Topics     Alcohol use: Not Currently     Comment: binge      Drug use: Never       Family History     Denies significant family history.      Prior to Admission Medications   Prior to Admission Medications   Prescriptions Last Dose Informant Patient Reported? Taking?   omeprazole (PRILOSEC) 20 MG DR capsule 12/3/2021 at Unknown time Self Yes Yes   Sig: Take 20 mg by mouth daily   tadalafil (CIALIS) 5 MG tablet Past Week at Unknown time Self Yes Yes   Sig: Take 5 mg by mouth daily as  needed   testosterone cypionate (DEPOTESTOSTERONE) 200 MG/ML injection 11/19/2021 Self Yes Yes   Sig: Inject 200 mg into the muscle every 14 days      Facility-Administered Medications: None     Allergies   No Known Allergies    Physical Exam   Vital Signs: Temp: 97.5  F (36.4  C) Temp src: Oral BP: 130/79 Pulse: 61   Resp: 16 SpO2: 95 % O2 Device: None (Room air)    Weight: 195 lbs 0 oz    General Appearance: well nourished male in NAD  Eyes: left scleral injection and periorbital erythema; EOMI though reporting pain with this  HEENT: mucous membranes moist, no neck LAD  Respiratory: lungs CTAB, no wheezes or crackles, no tachypnea   Cardiovascular: RRR, normal s1/s2 without murmur  GI: abdomen soft, normal bowel sounds, nontender, nondistended  Lymph/Hematologic: no peripheral edema   Skin: periorbital erythema   Musculoskeletal: extremities warm and well [id number]   Neurologic: alert and appropriate, cranial nerves grossly intact  Psychiatric: normal affect     Data   Data reviewed today: I reviewed all medications, new labs and imaging results over the last 24 hours. I personally reviewed no images or EKG's today.    Recent Labs   Lab 12/03/21  1122   WBC 7.6   HGB 16.3   MCV 84         POTASSIUM 3.9   CHLORIDE 107   CO2 26   BUN 13   CR 0.98   ANIONGAP 4   FAUSTINA 9.2   *     Recent Results (from the past 24 hour(s))   CT Orbital w Contrast    Narrative    CT SCAN OF THE ORBITS AND FACE WITH CONTRAST   12/3/2021 12:08 PM     HISTORY: Orbital cellulitis.    TECHNIQUE: CT images of the orbits were obtained following  administration of 50mL Isovue-370 IV. Radiation dose for this scan was  reduced using automated exposure control, adjustment of the mA and/or  kV according to patient size, or iterative reconstruction technique.     COMPARISON: None.    FINDINGS:     Left orbit: There is asymmetric skin thickening and subcutaneous  stranding in the preseptal soft tissues. Inflammatory fat stranding  is  noted within the inferior postseptal extraconal and intraconal fat  surrounding the inferior rectus muscle (series 6 image 57, series 3  image 20). No intraorbital or periorbital fluid collection is  identified. There may be mild asymmetric thickening of the left  inferior rectus muscle, which could be reactive to the adjacent  inflammatory changes or related to myositis. No intraorbital or  periorbital fluid collection is identified. No evidence for  subperiosteal abscess. The cavernous sinuses appear to enhance  symmetrically.    Right orbit: The ocular globe, optic nerve/sheath complex, extraocular  muscles, orbital fat, lacrimal glands, and bony orbit appear  unremarkable.    Multiple dental restorations of the maxillary teeth. Trace mucosal  thickening in the visualized aspects of the paranasal sinuses without  air-fluid levels. Mastoid and middle ear cavities are clear.  Degenerative changes in the upper cervical spine are noted.      Impression    IMPRESSION:  1. Findings that appear most consistent with preseptal and postseptal  left orbital cellulitis (Harpreet classification stage II), as  described.  2. Unremarkable right orbit.

## 2021-12-03 NOTE — PHARMACY-ADMISSION MEDICATION HISTORY
Pharmacy Medication History  Admission medication history interview status for the 12/3/2021  admission is complete. See EPIC admission navigator for prior to admission medications     Location of Interview: Phone  Medication history sources: Patient and Surescripts    Significant changes made to the medication list:  Added all meds    In the past week, patient estimated taking medication this percent of the time: greater than 90%    Additional medication history information:       Medication reconciliation completed by provider prior to medication history? No    Time spent in this activity: 15 min    Prior to Admission medications    Medication Sig Last Dose Taking? Auth Provider   omeprazole (PRILOSEC) 20 MG DR capsule Take 20 mg by mouth daily 12/3/2021 at Unknown time Yes Unknown, Entered By History   tadalafil (CIALIS) 5 MG tablet Take 5 mg by mouth daily as needed Past Week at Unknown time Yes Unknown, Entered By History   testosterone cypionate (DEPOTESTOSTERONE) 200 MG/ML injection Inject 200 mg into the muscle every 14 days 11/19/2021 Yes Unknown, Entered By History       The information provided in this note is only as accurate as the sources available at the time of update(s)

## 2021-12-03 NOTE — PROGRESS NOTES
RECEIVING UNIT ED HANDOFF REVIEW    ED Nurse Handoff Report was reviewed by: Tarsha Dozier RN on December 3, 2021 at 4:03 PM

## 2021-12-03 NOTE — PHARMACY-VANCOMYCIN DOSING SERVICE
Pharmacy Vancomycin Initial Note  Date of Service December 3, 2021  Patient's  1970  51 year old, male    Indication: Orbital Cellulitis    Current estimated CrCl = Estimated Creatinine Clearance: 100.3 mL/min (based on SCr of 0.98 mg/dL).    Creatinine for last 3 days  12/3/2021: 11:22 AM Creatinine 0.98 mg/dL    Recent Vancomycin Level(s) for last 3 days  No results found for requested labs within last 72 hours.      Vancomycin IV Administrations (past 72 hours)                   vancomycin 2000 mg in 0.9% NaCl 500 ml intermittent infusion 2,000 mg (mg) 2,000 mg New Bag 21 1316                Nephrotoxins and other renal medications (From now, onward)    Start     Dose/Rate Route Frequency Ordered Stop    21 0100  vancomycin (VANCOCIN) 1000 mg in dextrose 5% 200 mL PREMIX         1,000 mg  200 mL/hr over 1 Hours Intravenous EVERY 12 HOURS 21 1654      21 1800  ampicillin-sulbactam (UNASYN) 3 g vial to attach to  mL bag         3 g  over 15-30 Minutes Intravenous EVERY 6 HOURS 21 1629            Contrast Orders - past 72 hours (72h ago, onward)            Start     Dose/Rate Route Frequency Ordered Stop    21 1135  iopamidol (ISOVUE-370) solution 50 mL         50 mL Intravenous ONCE 21 1134 21 1155          [employer] Prediction of Planned Initial Vancomycin Regimen  Regimen: 1000 mg IV every 12 hours.  Start time: 01:00 am on 2021  Exposure target: AUC24 (range) 400-600 mg/L.hr   AUC24,ss: 461 mg/L.hr  Probability of AUC24 > 400: 65 %  Ctrough,ss: 14.5 mg/L  Probability of Ctrough,ss > 20: 25 %  Probability of nephrotoxicity (Lodise NAZIA ): 10 %        Plan:  1. Start vancomycin 2000 mg loading dose, then  1000 mg IV q12h.   2. Vancomycin monitoring method: AUC  3. Vancomycin therapeutic monitoring goal: 400-600 mg*h/L  4. Pharmacy will check vancomycin levels as appropriate in 1-3 Days.    5. Serum creatinine levels will be ordered daily for  the first week of therapy and at least twice weekly for subsequent weeks.      Vicenta Mullins, PharmD, BCPS

## 2021-12-03 NOTE — ED NOTES
"[facility]  ED Nurse Handoff Report    ED Chief complaint: Vision Changes Od      ED Diagnosis:   Final diagnoses:   None       Code Status: Full Code    Allergies: No Known Allergies    Patient Story: left eye pain, double vision, and shadow with left eye, double vision both ways, state he sees four of us.  Focused Assessment:  Young healthy appearing male with tenderness to his left orbit, face reddened    Treatments and/or interventions provided: CT, labs, IV abx  Patient's response to treatments and/or interventions: see epic    To be done/followed up on inpatient unit:  continue iv abx    Does this patient have any cognitive concerns?: no    Activity level - Baseline/Home:  Independent  Activity Level - Current:   Independent    Patient's Preferred language: English   Needed?: No    Isolation: None  Infection: Not Applicable  Patient tested for COVID 19 prior to admission: YES  Bariatric?: No    Vital Signs:   Vitals:    12/03/21 1058   BP: 130/79   Pulse: 61   Resp: 16   Temp: 97.5  F (36.4  C)   TempSrc: Oral   SpO2: 95%   Weight: 88.5 kg (195 lb)   Height: 1.753 m (5' 9\")       Cardiac Rhythm:     Was the PSS-3 completed:   Yes  What interventions are required if any?               Family Comments: here alone  OBS brochure/video discussed/provided to patient/family: N/A              Name of person given brochure if not patient: na              Relationship to patient: na    For the majority of the shift this patient's behavior was Green.   Behavioral interventions performed were na.    ED NURSE PHONE NUMBER: [phone]         "

## 2021-12-03 NOTE — ED PROVIDER NOTES
History   Chief Complaint:  Vision Changes Od       The history is provided by the patient.      Chad Bolivar is a 51 year old male with history of hypertension, anxiety, panic attack who presents with vision changes. The patient reports that he began experiencing double vision this morning. He notes both horizontal and vertical double vision when looking out of both eyes. A black spot in his left eye's visual field is also reported, positioned to the upper left of his focal point. He notes left eye swelling and pain with eye movement since yesterday, which increased in severity today. The patient also notes a headache localized around his left eye. He states that he was in [place] yesterday, where he visited his primary physician for an elbow concern. The patient also reports that he was recently ill over Thanksgiving with a URI. During that time, he reportedly tested negative for Covid-19 twice. He reports no sinusitis. He states that his visual acuity is unaffected. There have been no injuries to his left eye. The patient also denies fevers, nausea, or vomiting.     Review of Systems   Constitutional: Negative for fever.   Eyes: Positive for pain (L) and visual disturbance.   Gastrointestinal: Negative for nausea and vomiting.   Neurological: Positive for headaches.   10 systems reviewed and negative except as above and in HPI.     Allergies:  The patient has no known allergies.     Medications:  Lisinopril  Testosterone cypionate  Omeprazole    Past Medical History:     Hypertension    Hypogonadism  GERD  Adjustment disorder  Anxiety  Panic attack  Onychomycosis  Rosacea  Bradycardia  Acute pancreatitis  Hiatal hernia  Mild splenomegaly  Testosterone deficiency    Past Surgical History:    Orthopedic surgery  Vestibular mouth surgeries  Hernia repair  Esophagogastroduodenoscopy    Family History:    Father: MI, hypertension    Social History:  Presents to the emergency department alone  Arrives via  "car  Patient is   Works as an emergency medicine PA  History of alcoholism    Physical Exam     Patient Vitals for the past 24 hrs:   BP Temp Temp src Pulse Resp SpO2 Height Weight   12/03/21 1058 130/79 97.5  F (36.4  C) Oral 61 16 95 % 1.753 m (5' 9\") 88.5 kg (195 lb)       Physical Exam  General: Resting on the gurney, appears  uncomfortable  Head:  The scalp, face, and head appear normal  Mouth/Throat: Mucus membranes are moist  Eyes:  Left eye with surrounding redness and swelling. Pain with extraocular movements. Binocular diplopia present. No monocular diplopia. Visual field deficit in the superior lateral field in the left eye. No maxillary or frontal sinus tenderness to palpation.  CV:  Regular rate    Normal S1 and S2  No pathological murmur   Resp:  Breath sounds clear and equal bilaterally    Non-labored, no retractions or accessory muscle use    No coarseness    No wheezing   GI:  Abdomen is soft, no rigidity    No tenderness to palpation  MS:  Normal motor assessment of all extremities.    Good capillary refill noted.      Skin:   No rash or lesions noted.  Neuro:   Speech is normal and fluent. No apparent deficit.  Psych: Awake. Alert.  Normal affect.      Appropriate interactions.      Emergency Department Course     Imaging:  CT Orbital w Contrast   Preliminary Result   IMPRESSION:   1. Findings that appear most consistent with preseptal and postseptal   left orbital cellulitis (Harpreet classification stage II), as   described.   2. Unremarkable right orbit.           Report per radiology    Laboratory:  Labs Ordered and Resulted from Time of ED Arrival to Time of ED Departure   BASIC METABOLIC PANEL - Abnormal       Result Value    Sodium 137      Potassium 3.9      Chloride 107      Carbon Dioxide (CO2) 26      Anion Gap 4      Urea Nitrogen 13      Creatinine 0.98      Calcium 9.2      Glucose 112 (*)     GFR Estimate 90     CBC WITH PLATELETS AND DIFFERENTIAL - Abnormal    WBC Count 7.6  "     RBC Count 5.27      Hemoglobin 16.3      Hematocrit 44.2      MCV 84      MCH 30.9      MCHC 36.9 (*)     RDW 12.6      Platelet Count 157      % Neutrophils 69      % Lymphocytes 20      % Monocytes 6      % Eosinophils 3      % Basophils 1      % Immature Granulocytes 1      NRBCs per 100 WBC 0      Absolute Neutrophils 5.3      Absolute Lymphocytes 1.5      Absolute Monocytes 0.5      Absolute Eosinophils 0.2      Absolute Basophils 0.1      Absolute Immature Granulocytes 0.0      Absolute NRBCs 0.0     COVID-19 VIRUS (CORONAVIRUS) BY PCR        Emergency Department Course:  Reviewed:  I reviewed nursing notes, vitals, past medical history and Care Everywhere    Assessments:  1106 I obtained history and examined the patient as noted above.   1136 I rechecked the patient and explained findings.    Consults:  1254 I spoke with Dr. Bradford Villa, hospitalist, who agreed to admit the patient.   1302 I again spoke with Dr. Villa regarding the patient.  1320 I spoke with Dr. Margaux Foy, opthalmology, regarding the patient     Interventions:  1232 Unasyn 3 g IV  1315 Norco 1 tablet PO  1316 Vancomycin 2000 mg IV    Disposition:  The patient was admitted to the hospital under the care of Dr. Villa.     Impression & Plan     Medical Decision Making:  Chad Bolivar is a 51 year old male who presents for evaluation of left eye swelling nad pain.  The patient denies acute injury.  No headache or fever.  Diplopia as well as visual defect present.  No temporal tenderness to percussion or evidence of vasculitis.   The patient also has pain with extraocular movements, given the above, CT imaging indicated.  CT confirms clinical diagnosis of orbital cellulitis.  Patient was started on broad-spectrum antibiotics and will be admitted to the hospitalist service.  We do not have ophthalmology in our hospital and are requesting transfer for inpatient treatment at a location with ophthalmology.      Diagnosis:    ICD-10-CM     1. Orbital cellulitis on left  H05.012        Scribe Disclosure:  I, Saul Larson, am serving as a scribe at 11:04 AM on 12/3/2021 to document services personally performed by Georgia Parisi MD based on my observations and the provider's statements to me.              Georgia Parisi MD  12/03/21 7143

## 2021-12-03 NOTE — ED TRIAGE NOTES
Left eye pain with double vision seeing a dark spot off to the left starting yesterday   Denies any other symptoms

## 2021-12-03 NOTE — TELEPHONE ENCOUNTER
Received phone call from ED provider at [facility] who states that this patient presents with a few days of worsening redness and swelling around his eyes, scotoma, pain with EOM, and diplopia and was found to have orbital cellulitis on CT scan without any abscess. Reviewed imaging with oculoplastics fellow Dr. Freeman who agreed that it would be reasonable to admit and start broad spectrum IV antibiotic treatment and monitor for improvement and consider transfer if symptoms worsen or fail to improve. ED provider agreed with this plan.                    Margaux Foy MD  Resident Physician - PGY2  Department of Ophthalmology   [facility]

## 2021-12-04 LAB
ERYTHROCYTE [DISTWIDTH] IN BLOOD BY AUTOMATED COUNT: 12.7 % (ref 10–15)
HCT VFR BLD AUTO: 40.7 % (ref 40–53)
HGB BLD-MCNC: 14.7 G/DL (ref 13.3–17.7)
MCH RBC QN AUTO: 30.8 PG (ref 26.5–33)
MCHC RBC AUTO-ENTMCNC: 36.1 G/DL (ref 31.5–36.5)
MCV RBC AUTO: 85 FL (ref 78–100)
PLATELET # BLD AUTO: 144 10E3/UL (ref 150–450)
RBC # BLD AUTO: 4.77 10E6/UL (ref 4.4–5.9)
WBC # BLD AUTO: 6.2 10E3/UL (ref 4–11)

## 2021-12-04 PROCEDURE — 36415 COLL VENOUS BLD VENIPUNCTURE: CPT | Performed by: HOSPITALIST

## 2021-12-04 PROCEDURE — 250N000011 HC RX IP 250 OP 636: Performed by: HOSPITALIST

## 2021-12-04 PROCEDURE — 250N000013 HC RX MED GY IP 250 OP 250 PS 637: Performed by: HOSPITALIST

## 2021-12-04 PROCEDURE — 85027 COMPLETE CBC AUTOMATED: CPT | Performed by: HOSPITALIST

## 2021-12-04 PROCEDURE — 250N000011 HC RX IP 250 OP 636

## 2021-12-04 PROCEDURE — 99233 SBSQ HOSP IP/OBS HIGH 50: CPT | Performed by: HOSPITALIST

## 2021-12-04 PROCEDURE — 120N000001 HC R&B MED SURG/OB

## 2021-12-04 PROCEDURE — 250N000013 HC RX MED GY IP 250 OP 250 PS 637

## 2021-12-04 RX ORDER — CARBOXYMETHYLCELLULOSE SODIUM 5 MG/ML
1 SOLUTION/ DROPS OPHTHALMIC 4 TIMES DAILY PRN
Status: DISCONTINUED | OUTPATIENT
Start: 2021-12-04 | End: 2021-12-07 | Stop reason: HOSPADM

## 2021-12-04 RX ORDER — KETOROLAC TROMETHAMINE 15 MG/ML
15 INJECTION, SOLUTION INTRAMUSCULAR; INTRAVENOUS ONCE
Status: COMPLETED | OUTPATIENT
Start: 2021-12-04 | End: 2021-12-04

## 2021-12-04 RX ORDER — NALOXONE HYDROCHLORIDE 0.4 MG/ML
0.4 INJECTION, SOLUTION INTRAMUSCULAR; INTRAVENOUS; SUBCUTANEOUS
Status: DISCONTINUED | OUTPATIENT
Start: 2021-12-04 | End: 2021-12-07 | Stop reason: HOSPADM

## 2021-12-04 RX ORDER — NALOXONE HYDROCHLORIDE 0.4 MG/ML
0.2 INJECTION, SOLUTION INTRAMUSCULAR; INTRAVENOUS; SUBCUTANEOUS
Status: DISCONTINUED | OUTPATIENT
Start: 2021-12-04 | End: 2021-12-07 | Stop reason: HOSPADM

## 2021-12-04 RX ORDER — DIPHENHYDRAMINE HCL 25 MG
25 CAPSULE ORAL EVERY 6 HOURS PRN
Status: DISCONTINUED | OUTPATIENT
Start: 2021-12-04 | End: 2021-12-07 | Stop reason: HOSPADM

## 2021-12-04 RX ORDER — KETOROLAC TROMETHAMINE 15 MG/ML
15 INJECTION, SOLUTION INTRAMUSCULAR; INTRAVENOUS EVERY 6 HOURS PRN
Status: DISCONTINUED | OUTPATIENT
Start: 2021-12-04 | End: 2021-12-07 | Stop reason: HOSPADM

## 2021-12-04 RX ADMIN — KETOROLAC TROMETHAMINE 15 MG: 15 INJECTION, SOLUTION INTRAMUSCULAR; INTRAVENOUS at 16:28

## 2021-12-04 RX ADMIN — OXYCODONE HYDROCHLORIDE 5 MG: 5 TABLET ORAL at 14:47

## 2021-12-04 RX ADMIN — VANCOMYCIN HYDROCHLORIDE 1000 MG: 1 INJECTION, SOLUTION INTRAVENOUS at 01:22

## 2021-12-04 RX ADMIN — KETOROLAC TROMETHAMINE 15 MG: 15 INJECTION, SOLUTION INTRAMUSCULAR; INTRAVENOUS at 10:24

## 2021-12-04 RX ADMIN — OXYCODONE HYDROCHLORIDE 5 MG: 5 TABLET ORAL at 06:44

## 2021-12-04 RX ADMIN — AMPICILLIN SODIUM AND SULBACTAM SODIUM 3 G: 2; 1 INJECTION, POWDER, FOR SOLUTION INTRAMUSCULAR; INTRAVENOUS at 23:17

## 2021-12-04 RX ADMIN — ACETAMINOPHEN 650 MG: 325 TABLET, FILM COATED ORAL at 06:44

## 2021-12-04 RX ADMIN — KETOROLAC TROMETHAMINE 15 MG: 15 INJECTION, SOLUTION INTRAMUSCULAR; INTRAVENOUS at 22:29

## 2021-12-04 RX ADMIN — DIPHENHYDRAMINE HYDROCHLORIDE 25 MG: 25 CAPSULE ORAL at 06:52

## 2021-12-04 RX ADMIN — KETOROLAC TROMETHAMINE 15 MG: 15 INJECTION, SOLUTION INTRAMUSCULAR; INTRAVENOUS at 02:01

## 2021-12-04 RX ADMIN — ACETAMINOPHEN 650 MG: 325 TABLET, FILM COATED ORAL at 22:29

## 2021-12-04 RX ADMIN — PANTOPRAZOLE SODIUM 40 MG: 40 TABLET, DELAYED RELEASE ORAL at 06:41

## 2021-12-04 RX ADMIN — VANCOMYCIN HYDROCHLORIDE 1000 MG: 1 INJECTION, SOLUTION INTRAVENOUS at 13:28

## 2021-12-04 RX ADMIN — OXYCODONE HYDROCHLORIDE 5 MG: 5 TABLET ORAL at 23:17

## 2021-12-04 RX ADMIN — ACETAMINOPHEN 650 MG: 325 TABLET, FILM COATED ORAL at 14:47

## 2021-12-04 RX ADMIN — AMPICILLIN SODIUM AND SULBACTAM SODIUM 3 G: 2; 1 INJECTION, POWDER, FOR SOLUTION INTRAMUSCULAR; INTRAVENOUS at 06:41

## 2021-12-04 RX ADMIN — MELATONIN 5 MG TABLET 5 MG: at 22:29

## 2021-12-04 RX ADMIN — OXYCODONE HYDROCHLORIDE 5 MG: 5 TABLET ORAL at 02:09

## 2021-12-04 RX ADMIN — ACETAMINOPHEN 650 MG: 325 TABLET, FILM COATED ORAL at 00:12

## 2021-12-04 RX ADMIN — OXYCODONE HYDROCHLORIDE 5 MG: 5 TABLET ORAL at 19:02

## 2021-12-04 RX ADMIN — AMPICILLIN SODIUM AND SULBACTAM SODIUM 3 G: 2; 1 INJECTION, POWDER, FOR SOLUTION INTRAMUSCULAR; INTRAVENOUS at 19:02

## 2021-12-04 RX ADMIN — OXYCODONE HYDROCHLORIDE 5 MG: 5 TABLET ORAL at 10:45

## 2021-12-04 RX ADMIN — Medication 1 DROP: at 07:48

## 2021-12-04 RX ADMIN — AMPICILLIN SODIUM AND SULBACTAM SODIUM 3 G: 2; 1 INJECTION, POWDER, FOR SOLUTION INTRAMUSCULAR; INTRAVENOUS at 00:09

## 2021-12-04 RX ADMIN — AMPICILLIN SODIUM AND SULBACTAM SODIUM 3 G: 2; 1 INJECTION, POWDER, FOR SOLUTION INTRAMUSCULAR; INTRAVENOUS at 12:40

## 2021-12-04 ASSESSMENT — ACTIVITIES OF DAILY LIVING (ADL)
ADLS_ACUITY_SCORE: 3

## 2021-12-04 NOTE — PROGRESS NOTES
[facility]    Medicine Progress Note - Hospitalist Service       Date of Admission:  12/3/2021    Assessment & Plan         Chad Bolivar is a 51 year old male admitted on 12/3/2021.  Past history of GERD who presents with headache, left orbital pain and swelling, found to have left orbital cellulitis.     Left orbital cellulitis  Continues to report area of vision loss in left visual field as well as double vision.   CT orbit consistent with preseptal and postseptal left orbital cellulitis.    - ED discussed with ophthalmology at [facility] - did not recommend transfer for evaluation - recommend monitoring visual acuity and color vision and contact them if symptoms worsening or not improving   - continue vancomycin and Unasyn  - ID consulted. Continue Unasyn + vanco  [email or web address]     Discussed case with the pthalmology team over the phone in presence of patient himself, .. bess is  continue current abx regimen.. and re discuss again in the morning   - monitor visual acuity and color vision q4h    GERD  - continue PTA PPI       Diet: Combination Diet Regular Diet Adult    DVT Prophylaxis: Pneumatic Compression Devices  [place] Catheter: Not present  Central Lines: None  Code Status: Full Code      Disposition Plan   Expected discharge: 12/05/2021   recommended to prior living arrangement once  orpital celluliis stabilizes likeu 1- 2 more days.     The patient's care was discussed with the Bedside Nurse, Patient and Ophtalmology team on call at [facility].    Stuart Fair MD  Hospitalist Service  [facility]  Securely message with the Vocera Web Console (learn more here)  Text page via [employer] Paging/Directory        Clinically Significant Risk Factors Present on Admission                   ______________________________________________________________________    Interval History   Feels same, vision did not improve at all,  It did not get  worse either, continue to have pian.  I put the patient on the speaker phone when discussing the case with the Ophthalmology      Data reviewed today: I reviewed all medications, new labs and imaging results over the last 24 hours. I personally reviewed no images or EKG's today.    Physical Exam   Vital Signs: Temp: 97.5  F (36.4  C) Temp src: Oral BP: 126/85 Pulse: 50   Resp: 16 SpO2: 94 % O2 Device: None (Room air)    Weight: 195 lbs 0 oz  General Appearance: Alert in NAD   Respiratory:  CTA no wheezing or crackles  Cardiovascular:  RRR no murmurs  GI:  Soft NT/ND + Bs   Skin:  Warm  Left orbital area swollen  Other:     Data   Recent Labs   Lab 12/04/21  0740 12/03/21  1122   WBC 6.2 7.6   HGB 14.7 16.3   MCV 85 84   * 157   NA  --  137   POTASSIUM  --  3.9   CHLORIDE  --  107   CO2  --  26   BUN  --  13   CR  --  0.98   ANIONGAP  --  4   FAUSTINA  --  9.2   GLC  --  112*

## 2021-12-04 NOTE — PLAN OF CARE
Summary: L orbital cellulitis  DATE & TIME: 12/3/21-12/4/21 7145-7047   Cognitive Concerns/ Orientation : AOx4; calm and cooperative this shift.    BEHAVIOR & AGGRESSION TOOL COLOR: Green  CIWA SCORE: N/A   ABNL VS/O2: VSS on RA, q4h VS pt refusing second set overnight  MOBILITY: Independent  PAIN MANAGMENT: C/o 9/10 L eye pain PRN Tylenol, oxycodone and toradol.   DIET: Reg  BOWEL/BLADDER: Continent, up to BR   ABNL LAB/BG: N/A  DRAIN/DEVICES: PIV SL with int ABX  TELEMETRY RHYTHM: N/A  SKIN: L eye redness and swelling.   TESTS/PROCEDURES: N/A  D/C DAY/GOALS/PLACE: Pending workup and improvement  OTHER IMPORTANT INFO: Pt complaining of itching this shift, PRN benadryl given. Q4h neuro checks, pt experiencing double vision in B eyes. Pt reports L eye pain when looking side to side and up/down. On IV unasyn and vanco. ID consulted.

## 2021-12-04 NOTE — PROVIDER NOTIFICATION
MD Notification    Notified Person: MD    Notified Person Name: Dr. Wilhelm    Notification Date/Time: 12/4/21 0624    Notification Interaction: Text-paged     Purpose of Notification: Pt reporting diffuse itching, no rash present, can he get orders for benedryl?     Orders Received:    Comments:

## 2021-12-04 NOTE — PROVIDER NOTIFICATION
MD Notification    Notified Person: MD    Notified Person Name:  Melonie    Notification Date/Time: 12/4/21 0135    Notification Interaction: Text-paged     Purpose of Notification: Pt c/o 9/10 L eye/orbital pain. Requesting Toradol, can we get orders?     Orders Received:    Comments:

## 2021-12-04 NOTE — PLAN OF CARE
Pt was transferred here from ED this afternoon. A&O x4. VSS on RA. Up independent in room. Voiding well per BR. Has good appetite. Has constant pain on his L eye, PRN Oxycodone given with some help only per pt. Visual acuity and color vision intact but still has persistent double vision. On IV antibiotic for his L orbital cellulitis. Refused PCD's. Has wallet and pt refused to send it to security for safe keeping.

## 2021-12-04 NOTE — PLAN OF CARE
DATE & TIME: 12/3/21 7-11pm  Cognitive Concerns/ Orientation : AOx4, VSS on RA   BEHAVIOR & AGGRESSION TOOL COLOR: green  CIWA SCORE: na   ABNL VS/O2: na  MOBILITY: ind  PAIN MANAGMENT: oxycodone q4h  DIET: reg   BOWEL/BLADDER: continent voids in BR  SKIN: redness and warmth left side of face - ice packs to manage   TESTS/PROCEDURES:  ID following - consult tmrw   D/C DATE: pending   OTHER IMPORTANT INFO: pt  requesting to not be woken up for VS overnight, already took melatonin

## 2021-12-04 NOTE — PROVIDER NOTIFICATION
MD Notification    Notified Person: MD    Notified Person Name: Emerald Arciniega    Notification Date/Time: 12/3/21 1943 hrs    Notification Interaction: pg    Purpose of Notification: pt c/o heartburn, ok for Tums?    Orders Received:    Comments:

## 2021-12-04 NOTE — CONSULTS
Infectious Diseases Consultation  December 4, 2021  Chad Bolivar MRN# [id number]   Age: 51 year old YOB: 1970   Date of Admission: 12/3/2021     Reason for consult: I was asked to see this patient for evaluation of orbital cellulitis          Requesting physician Daisy              Past Medical History:  Past Medical History:   Diagnosis Date     Gastroesophageal reflux disease without esophagitis      Hypertension        Past Surgical History:  Past Surgical History:   Procedure Laterality Date     ORTHOPEDIC SURGERY Right     ACL repair       History of Present Illness:  51 year old male who presented 12/3 with left eye pain and swelling as well as visual changes.  He reports noting pain behind his left eye on Wednesday, suspected it may be a migraine, though he has no history of this.   He took some tylenol.  On Thursday he noted swelling below the eye and ongoing pain and tried some warm compresses and more tylenol.  On  12/3,  he had onset of double vision, increased pain as well as increased swelling.  He developed a black spot in his left visual field about the 11 o'clock position, reporting this is bout the size of his fist.  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  He denies any fever/chills, sweats, nausea, lightheadedness or other systemic symptoms.    Had bad sinusitis starting 1 wk  Prior to  Thanksgiving, he  Slowly improved  But  Had  Lot of  Congestion. No fever/chills.  Does not  Feel  Any better  No h/o  Orbital  Infection or  S.aureus infection      Antibiotics:  Amp sulbactam 3 g q 6  Vancomycin 1 g q 12 hours    Review of Systems: 10  Point  ROS o/w  neg    Social History: lives in     [place], no  Smoking,  PA in ER  Social History     Tobacco Use     Smoking status: Never Smoker     Smokeless tobacco: Never Used   Substance Use Topics     Alcohol use: Not Currently     Comment:  "binge         Family History:  Non contributory     Allergies:  This patient is allergic to has No Known Allergies.     Physical Exam:  Vitals were reviewed  Blood pressure 124/77, pulse 52, temperature 97.6  F (36.4  C), temperature source Oral, resp. rate 18, height 1.753 m (5' 9\"), weight 88.5 kg (195 lb), SpO2 95 %.  GEN: alert, O x 3  HEENT: sinuses  NT  Oral clear,  No  Exudate or erythema  L  Eye  Mildly  Swollen, minimal  Erythema,  Minimally tender,  EOMI but  Pain  With  Upward gaze  Conj  Minimally injected  LN: no  perauricular LA  No  Neck  LA  LUNG:  CTA  COR: RRR  SKIN: no rash,  No erythema     CT orbit-  FINDINGS:      Left orbit: There is asymmetric skin thickening and subcutaneous  stranding in the preseptal soft tissues. Inflammatory fat stranding is  noted within the inferior postseptal extraconal and intraconal fat  surrounding the inferior rectus muscle (series 6 image 57, series 3  image 20). No intraorbital or periorbital fluid collection is  identified. There may be mild asymmetric thickening of the left  inferior rectus muscle, which could be reactive to the adjacent  inflammatory changes or related to myositis. No intraorbital or  periorbital fluid collection is identified. No evidence for  subperiosteal abscess. The cavernous sinuses appear to enhance  symmetrically.     Right orbit: The ocular globe, optic nerve/sheath complex, extraocular  muscles, orbital fat, lacrimal glands, and bony orbit appear  unremarkable.     Multiple dental restorations of the maxillary teeth. Trace mucosal  thickening in the visualized aspects of the paranasal sinuses without  air-fluid levels. Mastoid and middle ear cavities are clear.  Degenerative changes in the upper cervical spine are noted.                                                                      IMPRESSION:  1. Findings that appear most consistent with preseptal and postseptal  left orbital cellulitis (Harpreet classification stage II), " as  described.  2. Unremarkable right orbit.  Data:  CBC  Recent Labs   Lab 12/04/21  0740 12/03/21  1122   WBC 6.2 7.6   HGB 14.7 16.3   * 157       BMP  Recent Labs   Lab 12/03/21  1122      POTASSIUM 3.9   CHLORIDE 107   FAUSTINA 9.2   CO2 26   BUN 13   CR 0.98   *       CULTURESNo results for input(s): CULT in the last 168 hours.     Attestation:  I have reviewed today's vital signs, notes, medications, labs and imaging.    ASSESSMENT:  1.  ORBITAL  CELLULITIS-has  L  Eye  Pain,  Double vision,  Black  Spot,  External  Exam  Not  Very remarkable, CT   with preseptal and postseptal  left orbital cellulitis;  No  Ozzy abscess or  Fluid  Collection, so  Far  Not  Improved  On amp  Sulbactam  And  Vancomycin,  Coverage  Would  Seem  Sufficient        RECOMMENDATION  1.  Cont  Amp  Sulbactam  And vancomycin  2. Follow  Exam,  Symptoms  3.  If  Not  Improving  opthomology needs  To see, ? F/u  CT  Orbit    Thanks  For  Asking  Me to see  Him!     MARÍA FREEMAN M.D.  O:[phone]   B:[phone]

## 2021-12-05 ENCOUNTER — TELEPHONE (OUTPATIENT)
Dept: OPHTHALMOLOGY | Facility: CLINIC | Age: 51
End: 2021-12-05
Payer: COMMERCIAL

## 2021-12-05 LAB
ANION GAP SERPL CALCULATED.3IONS-SCNC: 4 MMOL/L (ref 3–14)
BUN SERPL-MCNC: 20 MG/DL (ref 7–30)
CALCIUM SERPL-MCNC: 8.2 MG/DL (ref 8.5–10.1)
CHLORIDE BLD-SCNC: 106 MMOL/L (ref 94–109)
CO2 SERPL-SCNC: 28 MMOL/L (ref 20–32)
CREAT SERPL-MCNC: 1.11 MG/DL (ref 0.66–1.25)
ERYTHROCYTE [DISTWIDTH] IN BLOOD BY AUTOMATED COUNT: 12.4 % (ref 10–15)
GFR SERPL CREATININE-BSD FRML MDRD: 78 ML/MIN/1.73M2
GLUCOSE BLD-MCNC: 112 MG/DL (ref 70–99)
HCT VFR BLD AUTO: 38.9 % (ref 40–53)
HGB BLD-MCNC: 14.1 G/DL (ref 13.3–17.7)
MCH RBC QN AUTO: 30.9 PG (ref 26.5–33)
MCHC RBC AUTO-ENTMCNC: 36.2 G/DL (ref 31.5–36.5)
MCV RBC AUTO: 85 FL (ref 78–100)
PLATELET # BLD AUTO: 164 10E3/UL (ref 150–450)
POTASSIUM BLD-SCNC: 3.6 MMOL/L (ref 3.4–5.3)
RBC # BLD AUTO: 4.56 10E6/UL (ref 4.4–5.9)
SODIUM SERPL-SCNC: 138 MMOL/L (ref 133–144)
VANCOMYCIN SERPL-MCNC: 12.1 MG/L
WBC # BLD AUTO: 6.9 10E3/UL (ref 4–11)

## 2021-12-05 PROCEDURE — 250N000013 HC RX MED GY IP 250 OP 250 PS 637

## 2021-12-05 PROCEDURE — 99207 PR CDG-MDM COMPONENT: MEETS MODERATE - DOWN CODED: CPT | Performed by: HOSPITALIST

## 2021-12-05 PROCEDURE — 250N000013 HC RX MED GY IP 250 OP 250 PS 637: Performed by: HOSPITALIST

## 2021-12-05 PROCEDURE — 99232 SBSQ HOSP IP/OBS MODERATE 35: CPT | Performed by: HOSPITALIST

## 2021-12-05 PROCEDURE — 80202 ASSAY OF VANCOMYCIN: CPT

## 2021-12-05 PROCEDURE — 999N000128 HC STATISTIC PERIPHERAL IV START W/O US GUIDANCE

## 2021-12-05 PROCEDURE — 120N000001 HC R&B MED SURG/OB

## 2021-12-05 PROCEDURE — 36415 COLL VENOUS BLD VENIPUNCTURE: CPT | Performed by: HOSPITALIST

## 2021-12-05 PROCEDURE — 36415 COLL VENOUS BLD VENIPUNCTURE: CPT

## 2021-12-05 PROCEDURE — 250N000012 HC RX MED GY IP 250 OP 636 PS 637: Performed by: HOSPITALIST

## 2021-12-05 PROCEDURE — 250N000011 HC RX IP 250 OP 636: Performed by: HOSPITALIST

## 2021-12-05 PROCEDURE — 250N000011 HC RX IP 250 OP 636

## 2021-12-05 PROCEDURE — 80048 BASIC METABOLIC PNL TOTAL CA: CPT | Performed by: HOSPITALIST

## 2021-12-05 PROCEDURE — 85027 COMPLETE CBC AUTOMATED: CPT | Performed by: HOSPITALIST

## 2021-12-05 RX ORDER — PREDNISONE 20 MG/1
60 TABLET ORAL DAILY
Status: DISCONTINUED | OUTPATIENT
Start: 2021-12-05 | End: 2021-12-07 | Stop reason: HOSPADM

## 2021-12-05 RX ADMIN — ACETAMINOPHEN 650 MG: 325 TABLET, FILM COATED ORAL at 18:35

## 2021-12-05 RX ADMIN — OXYCODONE HYDROCHLORIDE 5 MG: 5 TABLET ORAL at 12:05

## 2021-12-05 RX ADMIN — Medication 1 DROP: at 12:09

## 2021-12-05 RX ADMIN — KETOROLAC TROMETHAMINE 15 MG: 15 INJECTION, SOLUTION INTRAMUSCULAR; INTRAVENOUS at 06:39

## 2021-12-05 RX ADMIN — AMPICILLIN SODIUM AND SULBACTAM SODIUM 3 G: 2; 1 INJECTION, POWDER, FOR SOLUTION INTRAMUSCULAR; INTRAVENOUS at 12:06

## 2021-12-05 RX ADMIN — AMPICILLIN SODIUM AND SULBACTAM SODIUM 3 G: 2; 1 INJECTION, POWDER, FOR SOLUTION INTRAMUSCULAR; INTRAVENOUS at 23:42

## 2021-12-05 RX ADMIN — AMPICILLIN SODIUM AND SULBACTAM SODIUM 3 G: 2; 1 INJECTION, POWDER, FOR SOLUTION INTRAMUSCULAR; INTRAVENOUS at 06:39

## 2021-12-05 RX ADMIN — AMPICILLIN SODIUM AND SULBACTAM SODIUM 3 G: 2; 1 INJECTION, POWDER, FOR SOLUTION INTRAMUSCULAR; INTRAVENOUS at 18:30

## 2021-12-05 RX ADMIN — ACETAMINOPHEN 650 MG: 325 TABLET, FILM COATED ORAL at 12:05

## 2021-12-05 RX ADMIN — KETOROLAC TROMETHAMINE 15 MG: 15 INJECTION, SOLUTION INTRAMUSCULAR; INTRAVENOUS at 19:13

## 2021-12-05 RX ADMIN — VANCOMYCIN HYDROCHLORIDE 1000 MG: 1 INJECTION, SOLUTION INTRAVENOUS at 00:00

## 2021-12-05 RX ADMIN — OXYCODONE HYDROCHLORIDE 5 MG: 5 TABLET ORAL at 16:46

## 2021-12-05 RX ADMIN — OXYCODONE HYDROCHLORIDE 5 MG: 5 TABLET ORAL at 22:06

## 2021-12-05 RX ADMIN — MELATONIN 5 MG TABLET 5 MG: at 22:06

## 2021-12-05 RX ADMIN — PANTOPRAZOLE SODIUM 40 MG: 40 TABLET, DELAYED RELEASE ORAL at 06:39

## 2021-12-05 RX ADMIN — KETOROLAC TROMETHAMINE 15 MG: 15 INJECTION, SOLUTION INTRAMUSCULAR; INTRAVENOUS at 13:01

## 2021-12-05 RX ADMIN — PREDNISONE 60 MG: 20 TABLET ORAL at 15:16

## 2021-12-05 RX ADMIN — VANCOMYCIN HYDROCHLORIDE 1000 MG: 1 INJECTION, SOLUTION INTRAVENOUS at 15:14

## 2021-12-05 ASSESSMENT — ACTIVITIES OF DAILY LIVING (ADL)
ADLS_ACUITY_SCORE: 3

## 2021-12-05 NOTE — PLAN OF CARE
Summary: L orbital cellulitis  DATE & TIME: 12/4/21 1900-0730   Cognitive Concerns/ Orientation : AOx4; calm and cooperative this shift.    BEHAVIOR & AGGRESSION TOOL COLOR: Green  CIWA SCORE: N/A   ABNL VS/O2: VSS on RA, refused 4am vitals check  MOBILITY: Independent  PAIN MANAGMENT: C/o 6/10 L eye pain PRN oxycodone, tylenol and toradol given x1.  Cold and warm compressed applied.   DIET: Reg  BOWEL/BLADDER: Continent, up to BR   ABNL LAB/BG: N/A  DRAIN/DEVICES: PIV SL with int ABX  TELEMETRY RHYTHM: N/A  SKIN: L eye redness and swelling.   TESTS/PROCEDURES: N/A  D/C DAY/GOALS/PLACE: Pending workup and improvement  OTHER IMPORTANT INFO: Q4h neuro checks of visual acuity and color unchanged, pt experiencing double vision in B eyes. Pt reports L eye pain when looking side to side and up/down. Has black visual area at the 11 o'clock in left eye and unchanged since admission.  On IV unasyn and vanco. ID consulted. MD spoke with ophthamology again today, no change to current plan at this time.     med 3 residents

## 2021-12-05 NOTE — PROGRESS NOTES
[facility]    Medicine Progress Note - Hospitalist Service       Date of Admission:  12/3/2021    Assessment & Plan         Chad Bolivar is a 51 year old male admitted on 12/3/2021.  Past history of GERD who presents with headache, left orbital pain and swelling, found to have left orbital cellulitis.     Left orbital cellulitis  Stable,  Both vision change and pain level are same as they were yesterday    Continues to report area of vision loss ( the size of a fist) in the left visual field as well as double vision.   CT orbit consistent with preseptal and postseptal left orbital cellulitis.    ID and ophthalmology consulted.   Continue Unasyn + vanco     Discussed case with the pthalmology team over the phone in presence of patient himself, plan is  continue current abx regimen, and a short course of steroids due to the fact that there was no tangible improvement after 2 days of abx, hence a possibility of orbital myocytes  .   If improved with steroids patient could be discharged tomorrow, and follow up in Ophthalmology clinic at [place]    - please [facility] Opthalmology consult  service at  daily for ongoing assistance   -continue to monitor visual acuity and color vision q4h    GERD  - continue PTA PPI       Diet: Combination Diet Regular Diet Adult    DVT Prophylaxis: Pneumatic Compression Devices  [place] Catheter: Not present  Central Lines: None  Code Status: Full Code      Disposition Plan   Expected discharge: 12/07/2021   recommended to prior living arrangement once  orpital celluliis stabilizes likeu 1- 2 more days.     The patient's care was discussed with the Bedside Nurse, Patient and Ophtalmology team on call at [facility].    Stuart Fair MD  Hospitalist Service  [facility]  Securely message with the Vocera Web Console (learn more here)  Text page via [employer] Paging/Directory        Clinically Significant Risk  Factors Present on Admission                ______________________________________________________________________    Interval History   Feels same, vision did not improve at all,  It did not get worse either.  I put the patient on the speaker phone when discussing the case with the Ophthalmology team at .      Data reviewed today: I reviewed all medications, new labs and imaging results over the last 24 hours. I personally reviewed no images or EKG's today.    Physical Exam   Vital Signs: Temp: 97.8  F (36.6  C) Temp src: Oral BP: 132/82 Pulse: 50   Resp: 20 SpO2: 95 % O2 Device: None (Room air)    Weight: 195 lbs 0 oz  General Appearance: Alert in NAD   Respiratory:  CTA no wheezing or crackles  Cardiovascular:  RRR no murmurs  GI:  Soft NT/ND + Bs   Skin:  Warm  Left orbital area swollen  Other:     Data   Recent Labs   Lab 12/05/21  0005 12/04/21  0740 12/03/21  1122   WBC 6.9 6.2 7.6   HGB 14.1 14.7 16.3   MCV 85 85 84    144* 157     --  137   POTASSIUM 3.6  --  3.9   CHLORIDE 106  --  107   CO2 28  --  26   BUN 20  --  13   CR 1.11  --  0.98   ANIONGAP 4  --  4   FAUSTINA 8.2*  --  9.2   *  --  112*

## 2021-12-05 NOTE — PHARMACY-VANCOMYCIN DOSING SERVICE
Pharmacy Vancomycin Note  Date of Service 2021  Patient's  1970   51 year old, male    Indication: Orbital cellulitis  Day of Therapy: 3  Current vancomycin regimen:  1000 mg IV q12h  Current vancomycin monitoring method: AUC  Current vancomycin therapeutic monitoring goal: 400-600 mg*h/L    InsightRX Prediction of Current Vancomycin Regimen  Regimen: 1000 mg IV every 12 hours.  Exposure target: AUC24 (range) 400-600 mg/L.hr   AUC24,ss: 450 mg/L.hr  Probability of AUC24 > 400: 74 %  Ctrough,ss: 13.8 mg/L  Probability of Ctrough,ss > 20: 6 %  Probability of nephrotoxicity (Lodise NAZIA ): 9 %      Current estimated CrCl = Estimated Creatinine Clearance: 88.6 mL/min (based on SCr of 1.11 mg/dL).    Creatinine for last 3 days  12/3/2021: 11:22 AM Creatinine 0.98 mg/dL  2021: 12:05 AM Creatinine 1.11 mg/dL    Recent Vancomycin Levels (past 3 days)  2021: 11:58 AM Vancomycin 12.1 mg/L    Vancomycin IV Administrations (past 72 hours)                   vancomycin (VANCOCIN) 1000 mg in dextrose 5% 200 mL PREMIX (mg) 1,000 mg New Bag 21 0000     1,000 mg New Bag 21 1328     1,000 mg New Bag  0122    vancomycin 2000 mg in 0.9% NaCl 500 ml intermittent infusion 2,000 mg (mg) 2,000 mg New Bag 21 1316                Nephrotoxins and other renal medications (From now, onward)    Start     Dose/Rate Route Frequency Ordered Stop    21 1012  ketorolac (TORADOL) injection 15 mg         15 mg Intravenous EVERY 6 HOURS PRN 21 1012 21 1011    21 0100  vancomycin (VANCOCIN) 1000 mg in dextrose 5% 200 mL PREMIX         1,000 mg  200 mL/hr over 1 Hours Intravenous EVERY 12 HOURS 21 1654      21 1800  ampicillin-sulbactam (UNASYN) 3 g vial to attach to  mL bag         3 g  over 15-30 Minutes Intravenous EVERY 6 HOURS 21 1629               Contrast Orders - past 72 hours (72h ago, onward)            Start     Dose/Rate Route Frequency Ordered  Stop    12/03/21 1135  iopamidol (ISOVUE-370) solution 50 mL         50 mL Intravenous ONCE 12/03/21 1134 12/03/21 1155          Interpretation of levels and current regimen:  Vancomycin level is reflective of -600    Has serum creatinine changed greater than 50% in last 72 hours: No    Urine output: Unable to determine    Renal Function: Worsening    InsightRX Prediction of Planned New Vancomycin Regimen  Continuing same regimen    Plan:  1. Continue Current Dose of Vancomycin 1000 mg IV q12h  2. Vancomycin monitoring method: AUC  3. Vancomycin therapeutic monitoring goal: 400-600 mg*h/L  4. Pharmacy will check vancomycin levels as appropriate in 1-3 Days.  5. Serum creatinine levels will be ordered daily for the first week of therapy and at least twice weekly for subsequent weeks.    Sheila Templeton, PharmD, PGY1 Resident

## 2021-12-05 NOTE — PLAN OF CARE
Summary: L orbital cellulitis  DATE & TIME: 12/4/21 4935-5970   Cognitive Concerns/ Orientation : AOx4; calm and cooperative this shift.    BEHAVIOR & AGGRESSION TOOL COLOR: Green  CIWA SCORE: N/A   ABNL VS/O2: VSS on RA  MOBILITY: Independent  PAIN MANAGMENT: C/o 6/10 L eye pain PRN oxycodone, tylenol and toradol given.  Cold and warm compressed applied.   DIET: Reg  BOWEL/BLADDER: Continent, up to BR   ABNL LAB/BG: N/A  DRAIN/DEVICES: PIV SL with int ABX  TELEMETRY RHYTHM: N/A  SKIN: L eye redness and swelling.   TESTS/PROCEDURES: N/A  D/C DAY/GOALS/PLACE: Pending workup and improvement  OTHER IMPORTANT INFO: Q4h neuro checks of visual acuity and color unchanged, pt experiencing double vision in B eyes. Pt reports L eye pain when looking side to side and up/down. Has black visual area at the 11 o'clock in left eye and unchanged since admission.  On IV unasyn and vanco. ID consulted. MD spoke with ophthamology again today, no change to current plan at this time.

## 2021-12-06 ENCOUNTER — APPOINTMENT (OUTPATIENT)
Dept: MRI IMAGING | Facility: CLINIC | Age: 51
DRG: 122 | End: 2021-12-06
Attending: PHYSICIAN ASSISTANT
Payer: COMMERCIAL

## 2021-12-06 ENCOUNTER — TELEPHONE (OUTPATIENT)
Dept: OPHTHALMOLOGY | Facility: CLINIC | Age: 51
End: 2021-12-06
Payer: COMMERCIAL

## 2021-12-06 LAB
CREAT SERPL-MCNC: 0.95 MG/DL (ref 0.66–1.25)
GFR SERPL CREATININE-BSD FRML MDRD: >90 ML/MIN/1.73M2
MRSA DNA SPEC QL NAA+PROBE: NEGATIVE
SA TARGET DNA: NEGATIVE

## 2021-12-06 PROCEDURE — 250N000012 HC RX MED GY IP 250 OP 636 PS 637: Performed by: HOSPITALIST

## 2021-12-06 PROCEDURE — 250N000011 HC RX IP 250 OP 636: Performed by: HOSPITALIST

## 2021-12-06 PROCEDURE — A9585 GADOBUTROL INJECTION: HCPCS | Performed by: HOSPITALIST

## 2021-12-06 PROCEDURE — 250N000013 HC RX MED GY IP 250 OP 250 PS 637: Performed by: HOSPITALIST

## 2021-12-06 PROCEDURE — 99232 SBSQ HOSP IP/OBS MODERATE 35: CPT | Performed by: PHYSICIAN ASSISTANT

## 2021-12-06 PROCEDURE — 250N000013 HC RX MED GY IP 250 OP 250 PS 637: Performed by: PHYSICIAN ASSISTANT

## 2021-12-06 PROCEDURE — 82565 ASSAY OF CREATININE: CPT | Performed by: HOSPITALIST

## 2021-12-06 PROCEDURE — 250N000013 HC RX MED GY IP 250 OP 250 PS 637

## 2021-12-06 PROCEDURE — 70543 MRI ORBT/FAC/NCK W/O &W/DYE: CPT

## 2021-12-06 PROCEDURE — 250N000011 HC RX IP 250 OP 636

## 2021-12-06 PROCEDURE — 120N000001 HC R&B MED SURG/OB

## 2021-12-06 PROCEDURE — 255N000002 HC RX 255 OP 636: Performed by: HOSPITALIST

## 2021-12-06 PROCEDURE — 87640 STAPH A DNA AMP PROBE: CPT | Performed by: INTERNAL MEDICINE

## 2021-12-06 PROCEDURE — 87641 MR-STAPH DNA AMP PROBE: CPT | Performed by: INTERNAL MEDICINE

## 2021-12-06 PROCEDURE — 36415 COLL VENOUS BLD VENIPUNCTURE: CPT | Performed by: HOSPITALIST

## 2021-12-06 RX ORDER — OXYCODONE HYDROCHLORIDE 5 MG/1
5 TABLET ORAL EVERY 6 HOURS PRN
Qty: 12 TABLET | Refills: 0 | Status: SHIPPED | OUTPATIENT
Start: 2021-12-06

## 2021-12-06 RX ORDER — PREDNISONE 10 MG/1
TABLET ORAL
Qty: 39 TABLET | Refills: 0 | Status: SHIPPED | OUTPATIENT
Start: 2021-12-06 | End: 2021-12-18

## 2021-12-06 RX ORDER — CARBOXYMETHYLCELLULOSE SODIUM 5 MG/ML
1 SOLUTION/ DROPS OPHTHALMIC 4 TIMES DAILY PRN
Qty: 1 EACH | Refills: 0 | Status: SHIPPED | OUTPATIENT
Start: 2021-12-06

## 2021-12-06 RX ORDER — GADOBUTROL 604.72 MG/ML
9 INJECTION INTRAVENOUS ONCE
Status: COMPLETED | OUTPATIENT
Start: 2021-12-06 | End: 2021-12-06

## 2021-12-06 RX ORDER — LORAZEPAM 0.5 MG/1
.5-1 TABLET ORAL
Status: COMPLETED | OUTPATIENT
Start: 2021-12-06 | End: 2021-12-06

## 2021-12-06 RX ADMIN — Medication 1 DROP: at 08:37

## 2021-12-06 RX ADMIN — KETOROLAC TROMETHAMINE 15 MG: 15 INJECTION, SOLUTION INTRAMUSCULAR; INTRAVENOUS at 01:42

## 2021-12-06 RX ADMIN — ACETAMINOPHEN 650 MG: 325 TABLET, FILM COATED ORAL at 21:15

## 2021-12-06 RX ADMIN — OXYCODONE HYDROCHLORIDE 5 MG: 5 TABLET ORAL at 12:31

## 2021-12-06 RX ADMIN — LORAZEPAM 0.5 MG: 0.5 TABLET ORAL at 17:39

## 2021-12-06 RX ADMIN — AMPICILLIN SODIUM AND SULBACTAM SODIUM 3 G: 2; 1 INJECTION, POWDER, FOR SOLUTION INTRAMUSCULAR; INTRAVENOUS at 06:08

## 2021-12-06 RX ADMIN — KETOROLAC TROMETHAMINE 15 MG: 15 INJECTION, SOLUTION INTRAMUSCULAR; INTRAVENOUS at 14:55

## 2021-12-06 RX ADMIN — MELATONIN 5 MG TABLET 5 MG: at 21:20

## 2021-12-06 RX ADMIN — PANTOPRAZOLE SODIUM 40 MG: 40 TABLET, DELAYED RELEASE ORAL at 08:29

## 2021-12-06 RX ADMIN — OXYCODONE HYDROCHLORIDE 5 MG: 5 TABLET ORAL at 16:38

## 2021-12-06 RX ADMIN — AMPICILLIN SODIUM AND SULBACTAM SODIUM 3 G: 2; 1 INJECTION, POWDER, FOR SOLUTION INTRAMUSCULAR; INTRAVENOUS at 19:14

## 2021-12-06 RX ADMIN — PREDNISONE 60 MG: 20 TABLET ORAL at 09:32

## 2021-12-06 RX ADMIN — GADOBUTROL 9 ML: 604.72 INJECTION INTRAVENOUS at 17:56

## 2021-12-06 RX ADMIN — OXYCODONE HYDROCHLORIDE 5 MG: 5 TABLET ORAL at 21:15

## 2021-12-06 RX ADMIN — OXYCODONE HYDROCHLORIDE 5 MG: 5 TABLET ORAL at 08:29

## 2021-12-06 RX ADMIN — KETOROLAC TROMETHAMINE 15 MG: 15 INJECTION, SOLUTION INTRAMUSCULAR; INTRAVENOUS at 21:15

## 2021-12-06 RX ADMIN — AMPICILLIN SODIUM AND SULBACTAM SODIUM 3 G: 2; 1 INJECTION, POWDER, FOR SOLUTION INTRAMUSCULAR; INTRAVENOUS at 11:47

## 2021-12-06 RX ADMIN — ACETAMINOPHEN 650 MG: 325 TABLET, FILM COATED ORAL at 11:46

## 2021-12-06 RX ADMIN — VANCOMYCIN HYDROCHLORIDE 1000 MG: 1 INJECTION, SOLUTION INTRAVENOUS at 00:21

## 2021-12-06 RX ADMIN — VANCOMYCIN HYDROCHLORIDE 1000 MG: 1 INJECTION, SOLUTION INTRAVENOUS at 12:24

## 2021-12-06 RX ADMIN — KETOROLAC TROMETHAMINE 15 MG: 15 INJECTION, SOLUTION INTRAMUSCULAR; INTRAVENOUS at 08:29

## 2021-12-06 ASSESSMENT — ACTIVITIES OF DAILY LIVING (ADL)
ADLS_ACUITY_SCORE: 3

## 2021-12-06 NOTE — TELEPHONE ENCOUNTER
Telephone Encounter    Dr. Fair called to discuss patient Chad Bolivar together with regard to progress of orbital inflammation.    48 hours onIV antibiotics, the patient does not feel there is significant improvement or worsening. Pain is largely unchanged, diplopia is unchanged as well. Per bedside assessment does not appear appreciably worse or better.    Discussed further that as the patient has not improved after 48 hours, it is worth considering escalating therapy as well as considering alternative diagnoses. Discussed that it is reasonable to trial short course of oral prednisone while on IV antibiotics. 60mg/day for3 days with a rapid taper to follow.    As he has not had improvement on antibiotics he just may need extra steroid help with reducing inflammation and swelling, alternatively he could have inflammatory condition such as orbital inflammatory syndrome. Prednisone is reasonable step in both scenarios.     It patient is still not improved, would recommend repeat CT orbits with contrast on Monday December 6th.     It is woth noting I have not assessed the patient myslef and have not seen the patient. Our assessment is thus limited and following his clinical progress is also limited to suvjective feelings rather than following objective ophtahlmic findings.    With this in mind if no improvement by tomorrow this warrants discussion about transferring the patient to [facility] for in person evaluation with oculoplastics vs discharging on oral regimen and getting into clinic asap for evaluation.     This care plan was discussed with the oculoplastics fellow Angela Freeman.     Alma Mcqueen MD  Ophthalmology Resident PGY3  [facility]

## 2021-12-06 NOTE — TELEPHONE ENCOUNTER
"Spoke with patient's wife regarding scheduling as a New  for: \"orbital cellulitis, seen in hospital, images in Epic\" -ok Js. MRI completed. Patient is currently in the Hospital. Scheduled patient as offered and patient will call to cancel if still in Hospital. Provided address for appointment.-Per Patient's wife  "

## 2021-12-06 NOTE — PROGRESS NOTES
[facility]    Infectious Disease Progress Note    Date of Service (when I saw the patient): 12/06/2021     Assessment & Plan   Chad Bolivar is a 51 year old male who was admitted on 12/3/2021.     ASSESSMENT:  1.  ORBITAL  CELLULITIS-has  L  Eye  Pain,  Double vision,  Black  Spot,  External  Exam  Not  Very remarkable, CT   with preseptal and postseptal  left orbital cellulitis;  No  Ozzy abscess or  Fluid  Collection, so  Far  Not  Improved  On amp  Sulbactam  And  Vancomycin,  Coverage  Would  Seem  Sufficient           RECOMMENDATION  1. Cont  Amp  Sulbactam  And vancomycin  2. Follow  Exam,  Symptoms, per pictures taken on 12/ 3 the swelling appears to be better.   3. Follow up imaging today.   4. If improving ok to discharge on oral augmentin with ophthalmology follow up.   5. Check MRSA PCR from the nares if positive add bactrim to Augmentin for discharge currently on vancomycin.       Siomara Tse MD    Interval History   Tolerating antibiotics ok   No new rashes or issues with antibiotics   Labs reviewed   Pictures in the chart reviewed     Physical Exam   Temp: 97.8  F (36.6  C) Temp src: Oral BP: (!) 147/94 Pulse: 84   Resp: 18 SpO2: 94 % O2 Device: None (Room air)    Vitals:    12/03/21 1058   Weight: 88.5 kg (195 lb)     Vital Signs with Ranges  Temp:  [97.8  F (36.6  C)-98.3  F (36.8  C)] 97.8  F (36.6  C)  Pulse:  [51-84] 84  Resp:  [18-20] 18  BP: (137-151)/(84-94) 147/94  SpO2:  [92 %-94 %] 94 %    Constitutional: Awake, alert, cooperative, no apparent distress  Lungs: Clear to auscultation bilaterally, no crackles or wheezing  Cardiovascular: Regular rate and rhythm, normal S1 and S2, and no murmur noted  Abdomen: Normal bowel sounds, soft, non-distended, non-tender  Skin: very minimal swelling on exam   Other:    Medications       ampicillin-sulbactam (UNASYN) IV  3 g Intravenous Q6H     pantoprazole  40 mg Oral QAM AC     predniSONE  60 mg Oral Daily     sodium  chloride (PF)  3 mL Intracatheter Q8H     vancomycin  1,000 mg Intravenous Q12H       Data   All microbiology laboratory data reviewed.  Recent Labs   Lab Test 12/05/21  0005 12/04/21  0740 12/03/21  1122   WBC 6.9 6.2 7.6   HGB 14.1 14.7 16.3   HCT 38.9* 40.7 44.2   MCV 85 85 84    144* 157     Recent Labs   Lab Test 12/05/21  0005 12/03/21  1122 06/16/19  1603   CR 1.11 0.98 0.93     No lab results found.  No lab results found.    Invalid input(s): UC

## 2021-12-06 NOTE — PLAN OF CARE
Summary: L orbital cellulitis  DATE & TIME: 12/5/21 7265-8557   Cognitive Concerns/ Orientation : AOx4; calm and cooperative this shift.    BEHAVIOR & AGGRESSION TOOL COLOR: Green  CIWA SCORE: N/A   ABNL VS/O2: VSS on RA,   MOBILITY: Independent  PAIN MANAGMENT: C/o 4-6/10 L eye pain toradol given x1.   DIET: Regular  BOWEL/BLADDER: Continent, up to BR   ABNL LAB/BG: N/A  DRAIN/DEVICES: PIV SL with int ABX  TELEMETRY RHYTHM: N/A  SKIN: L eye redness and swelling.   TESTS/PROCEDURES: N/A  D/C DAY/GOALS/PLACE: Pending workup and improvement  OTHER IMPORTANT INFO: Q4h neuro checks of visual acuity and color unchanged, pt experiencing double vision in B eyes. Pt reports L eye pain when looking side to side and up/down. Has black visual area at the 11 o'clock in left eye and unchanged since admission.  On IV unasyn and vanco. ID consulted. MD spoke with ophthamology again today, started prednisone, if no improvement after 24hr may repeat CT.

## 2021-12-06 NOTE — TELEPHONE ENCOUNTER
Spoke with hospitalist and patient today over the phone. Prednisone 60mg PO was started yesterday along with continued Unasyn (day 3 today). Patient states his scotoma has improved compared to yesterday, feels it is more transparent and gray rather than black and opaque. He also feels his proptosis has improved slightly. He does continue to have pain and diplopia with EOM. Given improvement, will not pursue further imaging and will continue steroid treatment for possible inflammatory etiology. Patient will be discharged with augmentin and prednisone taper 60mg x3 days, 40mg x3 days, 20mg x 3 days, 10mg x 3 days, then stop. Will follow closely in oculoplastics clinic. Our  will call to arrange follow up with him in clinic this week or early next week - messaged our schedulers to arrange. Instructed to present to ED with any concerns for worsening symptoms. Discussed this patient with oculoplastics fellow Dr. Angela Freeman who agreed with this assessment and plan.     Margaux Foy MD  Resident Physician - PGY2  Department of Ophthalmology   [facility]

## 2021-12-06 NOTE — PROGRESS NOTES
[facility]    Medicine Progress Note - Hospitalist Service       Date of Admission:  12/3/2021  Assessment & Plan   Chad Bolivar is a 51 year old male admitted on 12/3/2021.  Past history of GERD who presents with headache, left orbital pain and swelling, found to have left orbital cellulitis.     Asymptomatic COVID-19 admission screen: Negative on admission.     Left orbital cellulitis  Presents with above symptoms.  Reporting area of vision loss in left visual field as well as diplopia.  Afebrile without leukocytosis though exam consistent with periorbital cellulitis. CT orbit consistent with preseptal and postseptal left orbital cellulitis. Started on steroids 12/5 due to minimal to no improvement to vision and pain. 12/6 reported slight improvement to the area of vision loss (size of a fist) in the left visual field at 11' feeling it has lightened up.   - Ophthalmology has been following remotely, discussed with the team over the telephone 12/6 and he was cleared for discharge from their perspective and recommended course of Augmentin   - ID following, appreciate assistance,   - Treated with Unasyn + vancomycin, plan for Augmentin course on discharge and pending MRSA swab if positive add Bactrim  - Started prednisone 60mg/d 12/5, will discharge with steroid taper 60mg x3 days, 40mg x3 days, 20mg x3 days, 10mg x3 days  - Prescribed eye drops and 3 days of oxycodone, may use APAP at home  - Pt to see Ophthalmology clinic this week, they will call to set up  - Cleared to discharge 12/6 however given mild improvement in symptoms and no on site ophthalmology evaluation I elected to pursue orbital MRI w/wo prior to discharge. If this study does not show any new abscesses or other acute issues then the patient may discharge home tonight 12/6 +/- Bactrim pending MRSA swab    12/6/21        12/3/21             High blood pressure without diagnosis of hypertension  - Suspect steroid induced,  "monitor in the outpatient setting    GERD: Continue PTA PPI      Diet: Combination Diet Regular Diet Adult    DVT Prophylaxis: Ambulate every shift  [place] Catheter: Not present  Code Status: Full Code      Disposition Plan   Expected discharge: 12/6/21 recommended to prior living arrangement once MRI completed.  Entered: Janis Brady PA-C 12/06/2021, 8:06 AM       The patient's care was discussed with the Attending Physician, Dr. Roth, Bedside Nurse, Patient and Opthalmology  Consultant.    Janis Brady ([place]), AB JACKSON  Hospitalist TUYET  [facility]  Securely message with the Vocera Web Console (learn more here)  Text page via [facility] Paging/Directory  ______________________________________________________________________    Interval History   Seen and examined. The patient feels his area of vision loss which he describes the size of a fist has marginally improved and \"lightened up\" in color. Pain and diplopia stable.     Data reviewed today: I reviewed all medications, new labs and imaging results over the last 24 hours. I personally reviewed no images or EKG's today.    Physical Exam   Vital Signs: Temp: 98.2  F (36.8  C) Temp src: Oral BP: 137/84 Pulse: 67   Resp: 18 SpO2: 92 % O2 Device: None (Room air)    Weight: 195 lbs 0 oz    Physical Exam    General: Awake, alert, very pleasant middle aged gentleman who appears stated age. Looks comfortable sitting up in bed. No acute distress.  HEENT: Normocephalic, atraumatic. Extraocular movements intact. Pupils equal round and reactive to light bilaterally. Minimal edema and erythema to Lt periorbital region. No drainage. No open areas.  Respiratory: Clear to auscultation bilaterally, no rales, wheezing, or rhonchi.  Cardiovascular: Regular rate and rhythm, +S1 and S2, no murmur auscultated. No peripheral edema.   Gastrointestinal: Soft, non-tender, non-distended. Bowel sounds present.  Skin: Warm, dry. No obvious rashes or " lesions on exposed skin. Dorsalis pedis pulses palpable bilaterally.  Musculoskeletal: No joint swelling, erythema or tenderness. Moves all extremities equally.  Neurologic: AAO x3. Cranial nerves 2-12 grossly intact, normal strength and sensation.  Psychiatric: Appropriate mood and affect. No obvious anxiety or depression.    Data   Recent Labs   Lab 12/06/21  1118 12/05/21  0005 12/04/21  0740 12/03/21  1122   WBC  --  6.9 6.2 7.6   HGB  --  14.1 14.7 16.3   MCV  --  85 85 84   PLT  --  164 144* 157   NA  --  138  --  137   POTASSIUM  --  3.6  --  3.9   CHLORIDE  --  106  --  107   CO2  --  28  --  26   BUN  --  20  --  13   CR 0.95 1.11  --  0.98   ANIONGAP  --  4  --  4   FAUSTINA  --  8.2*  --  9.2   GLC  --  112*  --  112*     No results found for this or any previous visit (from the past 24 hour(s)).  Medications       ampicillin-sulbactam (UNASYN) IV  3 g Intravenous Q6H     pantoprazole  40 mg Oral QAM AC     predniSONE  60 mg Oral Daily     sodium chloride (PF)  3 mL Intracatheter Q8H     vancomycin  1,000 mg Intravenous Q12H

## 2021-12-06 NOTE — PLAN OF CARE
"Summary: L orbital cellulitis  DATE & TIME: 12/6/21 7141-1465  Cognitive Concerns/ Orientation : AOx4;  BEHAVIOR: Green  CIWA SCORE: N/A   ABNL VS/O2: VSS on RA,   MOBILITY: Independent  PAIN MANAGMENT: C/o  L eye pain toradol given x1; Oxycodone given x 2; tylenol x 1; pain \"3\" this afternoon  DIET: Regular  BOWEL/BLADDER: Continent, up to BR   ABNL LAB/BG: N/A  DRAIN/DEVICES: PIV SL with int ABX  TELEMETRY RHYTHM: N/A  SKIN: L eye redness and swelling which appears to be improving   TESTS/PROCEDURES:  MRI  D/C DAY/GOALS/PLACE:  pending MRI results; follow up with opthalmalogy  OTHER IMPORTANT INFO: Q4h neuro checks of visual acuity and color unchanged, pt experiencing double vision.  Pt reports L eye pain when looking side to side and up/down. Has black visual area at the 11 o'clock in left eye has slightly improved since admission.  On IV unasyn and vanco and prednisone. Requesting ativan prior to MRI.  "

## 2021-12-06 NOTE — PLAN OF CARE
Summary: L orbital cellulitis  DATE & TIME: 12/5/21 8317-9767   Cognitive Concerns/ Orientation : AOx4; calm and cooperative this shift.    BEHAVIOR & AGGRESSION TOOL COLOR: Green  CIWA SCORE: N/A   ABNL VS/O2: VSS on RA,   MOBILITY: Independent  PAIN MANAGMENT: C/o 4-6/10 L eye pain PRN oxycodone, tylenol and toradol given x1.  Cold and warm compressed applied.   DIET: Reg  BOWEL/BLADDER: Continent, up to BR   ABNL LAB/BG: N/A  DRAIN/DEVICES: PIV SL with int ABX  TELEMETRY RHYTHM: N/A  SKIN: L eye redness and swelling.   TESTS/PROCEDURES: N/A  D/C DAY/GOALS/PLACE: Pending workup and improvement  OTHER IMPORTANT INFO: Q4h neuro checks of visual acuity and color unchanged, pt experiencing double vision in B eyes. Pt reports L eye pain when looking side to side and up/down. Has black visual area at the 11 o'clock in left eye and unchanged since admission.  On IV unasyn and vanco. ID consulted. MD spoke with ophthamology again today, started prednisone, if no improvement after 24hr may repeat CT.

## 2021-12-07 ENCOUNTER — OFFICE VISIT (OUTPATIENT)
Dept: OPHTHALMOLOGY | Facility: CLINIC | Age: 51
End: 2021-12-07
Payer: COMMERCIAL

## 2021-12-07 ENCOUNTER — PRE VISIT (OUTPATIENT)
Dept: OPHTHALMOLOGY | Facility: CLINIC | Age: 51
End: 2021-12-07

## 2021-12-07 ENCOUNTER — LAB (OUTPATIENT)
Dept: LAB | Facility: CLINIC | Age: 51
End: 2021-12-07
Attending: OPHTHALMOLOGY
Payer: COMMERCIAL

## 2021-12-07 VITALS
SYSTOLIC BLOOD PRESSURE: 142 MMHG | RESPIRATION RATE: 18 BRPM | TEMPERATURE: 97.7 F | HEIGHT: 69 IN | DIASTOLIC BLOOD PRESSURE: 97 MMHG | WEIGHT: 195 LBS | BODY MASS INDEX: 28.88 KG/M2 | HEART RATE: 57 BPM | OXYGEN SATURATION: 94 %

## 2021-12-07 DIAGNOSIS — H53.10 SUBJECTIVE VISUAL DISTURBANCE OF BOTH EYES: ICD-10-CM

## 2021-12-07 DIAGNOSIS — H05.122 ORBITAL MYOSITIS OF LEFT SIDE: Primary | ICD-10-CM

## 2021-12-07 DIAGNOSIS — H53.2 DIPLOPIA: ICD-10-CM

## 2021-12-07 DIAGNOSIS — H05.122 ORBITAL MYOSITIS OF LEFT SIDE: ICD-10-CM

## 2021-12-07 LAB
CRP SERPL-MCNC: 3.2 MG/L (ref 0–8)
ERYTHROCYTE [SEDIMENTATION RATE] IN BLOOD BY WESTERGREN METHOD: 4 MM/HR (ref 0–15)

## 2021-12-07 PROCEDURE — 250N000011 HC RX IP 250 OP 636

## 2021-12-07 PROCEDURE — 99204 OFFICE O/P NEW MOD 45 MIN: CPT | Mod: GC | Performed by: OPHTHALMOLOGY

## 2021-12-07 PROCEDURE — 86140 C-REACTIVE PROTEIN: CPT | Performed by: PATHOLOGY

## 2021-12-07 PROCEDURE — 250N000012 HC RX MED GY IP 250 OP 636 PS 637: Performed by: HOSPITALIST

## 2021-12-07 PROCEDURE — 99000 SPECIMEN HANDLING OFFICE-LAB: CPT | Performed by: PATHOLOGY

## 2021-12-07 PROCEDURE — 85652 RBC SED RATE AUTOMATED: CPT | Performed by: PATHOLOGY

## 2021-12-07 PROCEDURE — 250N000013 HC RX MED GY IP 250 OP 250 PS 637: Performed by: HOSPITALIST

## 2021-12-07 PROCEDURE — 250N000011 HC RX IP 250 OP 636: Performed by: HOSPITALIST

## 2021-12-07 PROCEDURE — 86255 FLUORESCENT ANTIBODY SCREEN: CPT | Mod: 90 | Performed by: PATHOLOGY

## 2021-12-07 PROCEDURE — 250N000013 HC RX MED GY IP 250 OP 250 PS 637: Performed by: INTERNAL MEDICINE

## 2021-12-07 PROCEDURE — 82164 ANGIOTENSIN I ENZYME TEST: CPT | Mod: 90 | Performed by: PATHOLOGY

## 2021-12-07 PROCEDURE — 250N000013 HC RX MED GY IP 250 OP 250 PS 637

## 2021-12-07 PROCEDURE — 99207 PR CDG-CODE CATEGORY CHANGED: CPT | Performed by: PHYSICIAN ASSISTANT

## 2021-12-07 PROCEDURE — 92133 CPTRZD OPH DX IMG PST SGM ON: CPT | Mod: GC | Performed by: OPHTHALMOLOGY

## 2021-12-07 PROCEDURE — 85549 MURAMIDASE: CPT | Mod: 90 | Performed by: PATHOLOGY

## 2021-12-07 PROCEDURE — 36415 COLL VENOUS BLD VENIPUNCTURE: CPT | Performed by: PATHOLOGY

## 2021-12-07 PROCEDURE — 86256 FLUORESCENT ANTIBODY TITER: CPT | Mod: 90 | Performed by: PATHOLOGY

## 2021-12-07 PROCEDURE — 86431 RHEUMATOID FACTOR QUANT: CPT | Mod: 90 | Performed by: PATHOLOGY

## 2021-12-07 PROCEDURE — 250N000013 HC RX MED GY IP 250 OP 250 PS 637: Performed by: NURSE PRACTITIONER

## 2021-12-07 PROCEDURE — 99207 PR APP CREDIT; MD BILLING SHARED VISIT: CPT | Performed by: PHYSICIAN ASSISTANT

## 2021-12-07 PROCEDURE — 84445 ASSAY OF TSI GLOBULIN: CPT | Mod: 90 | Performed by: PATHOLOGY

## 2021-12-07 PROCEDURE — 92083 EXTENDED VISUAL FIELD XM: CPT | Mod: GC | Performed by: OPHTHALMOLOGY

## 2021-12-07 RX ORDER — MAGNESIUM HYDROXIDE/ALUMINUM HYDROXICE/SIMETHICONE 120; 1200; 1200 MG/30ML; MG/30ML; MG/30ML
30 SUSPENSION ORAL EVERY 4 HOURS PRN
Status: DISCONTINUED | OUTPATIENT
Start: 2021-12-07 | End: 2021-12-07 | Stop reason: HOSPADM

## 2021-12-07 RX ADMIN — AMPICILLIN SODIUM AND SULBACTAM SODIUM 3 G: 2; 1 INJECTION, POWDER, FOR SOLUTION INTRAMUSCULAR; INTRAVENOUS at 00:27

## 2021-12-07 RX ADMIN — VANCOMYCIN HYDROCHLORIDE 1000 MG: 1 INJECTION, SOLUTION INTRAVENOUS at 01:07

## 2021-12-07 RX ADMIN — OXYCODONE HYDROCHLORIDE 5 MG: 5 TABLET ORAL at 02:07

## 2021-12-07 RX ADMIN — KETOROLAC TROMETHAMINE 15 MG: 15 INJECTION, SOLUTION INTRAMUSCULAR; INTRAVENOUS at 08:28

## 2021-12-07 RX ADMIN — ALUMINUM HYDROXIDE, MAGNESIUM HYDROXIDE, AND SIMETHICONE 30 ML: 200; 200; 20 SUSPENSION ORAL at 02:40

## 2021-12-07 RX ADMIN — AMPICILLIN SODIUM AND SULBACTAM SODIUM 3 G: 2; 1 INJECTION, POWDER, FOR SOLUTION INTRAMUSCULAR; INTRAVENOUS at 06:33

## 2021-12-07 RX ADMIN — CALCIUM CARBONATE (ANTACID) CHEW TAB 500 MG 1000 MG: 500 CHEW TAB at 01:48

## 2021-12-07 RX ADMIN — PANTOPRAZOLE SODIUM 40 MG: 40 TABLET, DELAYED RELEASE ORAL at 06:33

## 2021-12-07 RX ADMIN — PREDNISONE 60 MG: 20 TABLET ORAL at 08:22

## 2021-12-07 RX ADMIN — CALCIUM CARBONATE (ANTACID) CHEW TAB 500 MG 1000 MG: 500 CHEW TAB at 01:08

## 2021-12-07 ASSESSMENT — CONF VISUAL FIELD
OD_NORMAL: 1
METHOD: COUNTING FINGERS
OS_NORMAL: 1

## 2021-12-07 ASSESSMENT — VISUAL ACUITY
OD_SC: 20/20
OS_SC: 20/25
OD_SC+: -1
METHOD: SNELLEN - LINEAR
OS_SC+: -2+3

## 2021-12-07 ASSESSMENT — SLIT LAMP EXAM - LIDS
COMMENTS: NORMAL
COMMENTS: NORMAL

## 2021-12-07 ASSESSMENT — ACTIVITIES OF DAILY LIVING (ADL)
ADLS_ACUITY_SCORE: 3

## 2021-12-07 ASSESSMENT — EXTERNAL EXAM - RIGHT EYE: OD_EXAM: NORMAL

## 2021-12-07 ASSESSMENT — TONOMETRY
OS_IOP_MMHG: 9
IOP_METHOD: ICARE
OD_IOP_MMHG: 10

## 2021-12-07 NOTE — TELEPHONE ENCOUNTER
FUTURE VISIT INFORMATION      FUTURE VISIT INFORMATION:    Date: 12/7/21    Time: 10:30am    Location: [facility] [place]  REFERRAL INFORMATION:    Referring provider:  Bradford Villa MD     Referring providers clinic:  [facility]    Reason for visit/diagnosis  orbital cellulitis, seen in hospital    RECORDS REQUESTED FROM:       Clinic name Comments Records Status Imaging Status   [facility] ED Visit 12/3/21  MR Orbits done 12/6/21  CT Orbital done 12/3/21 Epic [facility]

## 2021-12-07 NOTE — NURSING NOTE
Chief Complaints and History of Present Illnesses   Patient presents with     Consult For     Chief Complaint(s) and History of Present Illness(es)     Consult For     Laterality: left eye    Onset: months ago    Frequency: constantly    Course: gradually improving    Associated symptoms: flashes    Treatments tried: artificial tears    Pain scale: 2/10              Comments     Chad Bolivar is being seen today at the request of  Bradford Villa for left orbital cellulitis. Pt states vision has started to improve, looking up to left double vision. No pain.     Prednisone 60mg- tapering    JONNIE Cosme COT 10:33 AM December 7, 2021

## 2021-12-07 NOTE — PROVIDER NOTIFICATION
Patient is requesting Maalox for acid reflux, TUMS x 2 doses are not helping. Patient drinking and eating  Ginger Ale and saltine crackers with no relief. Text out to hospitalist service for Maalox.

## 2021-12-07 NOTE — PROGRESS NOTES
Chief Complaint(s) and History of Present Illness(es)     Consult For     Laterality: left eye    Onset: months ago    Frequency: constantly    Course: gradually improving    Associated symptoms: flashes    Treatments tried: artificial tears    Pain scale: 2/10              Comments     Chad Bolivar is being seen today at the request of  Bradford Villa for   left orbital cellulitis. Pt states vision has started to improve, looking   up to left double vision. No pain.     Prednisone 60mg- tapering    Faisal Garcia, COT COT 10:33 AM December 7, 2021       Over thanksgiving, had URI, tested negative for covid. Now presents with 5 day history of left retroorbital pain. Next day noticed swelling under left eye, used warm compresses without improvement. The following day, he had worsened left periorbital edema. Was hosptialized over the weekend for left orbital cellulitis s/p 4 days of IV antibiotics without much improvement in exam. Was then started on PO prednisone 60mg for the past 2 days, and now notices significant improvement in binocular vertical diplopia, pain with EOMs, left retroorbital pain, and floaters with dark cloud in left superotemporal vision.     MRI orbits 12/6/21:  IMPRESSION:  LEFT ORBIT:  Asymmetric mild enlargement of the left inferior rectus muscle with abnormal signal and enhancement. There is surrounding amorphous T2/FLAIR hyperintense signal and enhancement in the adjacent post-septal intraconal and extraconal fat. Suggestion of   mild  asymmetric enhancement along the left optic nerve sheath. There is also subtle asymmetric enhancement involving the region of the left cavernous sinus and along the anteromedial aspect of the left middle cranial fossa. The findings are nonspecific.   Primary differential considerations would include infectious post-septal orbital cellulitis or noninfectious inflammatory/immune-mediated conditions such as IgG-4 disease, sarcoidosis, idiopathic orbital  "inflammation, or Patti-Hunt syndrome (given the   asymmetric enhancement of the left cavernous sinus region). Follow-up is recommended.    RIGHT ORBIT: Unremarkable.      Assessment & Plan     Chad Bolivar is a 51 year old male with the following diagnoses:   Encounter Diagnoses   Name Primary?     Orbital myositis of left side Yes     Diplopia       - Binocular vertical diplopia in superolateral gaze, pain with EOMs, both of which are improving with PO steroids and PO abx  - MRI shows enhancement of left inferior rectus extending along left optic nerve sheath, left cavernous sinus, anteromedial left middle cranial fossa  - no APD, EOMs full  - ddx includes inflammatory etiology, orbital myositis, ADY, less likely orbital cellulitis  - continue 10 day course of PO augmentin  - continue steroid taper: 60mg x 3 days, 40mg x 3 days, 20mg x 3 days, 10mg x 3 days, then stop  - will order: ANCA, TSI, ESR, CRP, RF  - baseline OCT and VF today  - Will plan for DFE at follow up. Recommend DFE today given scotoma, but patient defers dilation given weather conditions and that he drove himself today.     Angela Freeman MD  Oculoplastics Fellow  Agree with above. New consult.    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 He also noted what he describes as an area of his vision superotemporally being gray, which has improved (nearly resolved) on steroids. He was initially treated at [facility] with antibiotics but progressively worsened. Good response to prednisone now day 3 of 60 mg.     CT pre steroids 12/3 obtained at [facility] reviewed, reveals enlarged inferior rectus with fat stranding which appears to be adjacent to the inferior rectus.     MRI obtained 12/6/2021, day 2 on 60 mg of prednisone, Personal review reveals enlarged left inferior rectus. Radiology read \"suggestion of mild asymmetric enhancement of left optic nerve sheath. There is also subtle " "enhancement involving the region of the left cavernous sinus...\" I agree there may be a bit of nerve sheath enhancement but it is quite subtle, and could be normal.    Given acute onset, pain, and quick response to steroids orbital myositis/IOIS is likely. Denies palpitations, weight changes, did have a URI several weeks ago with cough, but no chronic cough, hemoptysis, no sinus disorders, or epistaxis, no hematuria.     Will obtain orbital inflammatory labs. Given he has been on 60 mg of prednisone will hold on imaging the chest, but will consider in the future.   Orders Placed This Encounter   Procedures     Thyroid stimulating immunoglobulin     ANCA IgG by IFA with Reflex to Titer     Rheumatoid factor     ESR: Erythrocyte sedimentation rate     CRP, inflammation     Angiotensin converting enzyme     Lysozyme, serum     He refuses dilating drops today due to poor driving conditions.  OCT retinal nerve fiber layer reveals normal retinal nerve fiber layer and macula on the right, normal macula on the left, and retinal nerve fiber layer is borderline thin temporally.  Octopus visual field normal right eye MD -0.3, and left eye is -0.4 with non-specific changes.   Continue prednisone taper (see Dr. Freeman's note above).   F/u in ~4 weeks, sooner with vision changes.     Rosalind Wasserman MD     Attending Physician Attestation: Complete documentation of historical and exam elements from today's encounter can be found in the full encounter summary report (not reduplicated in this progress note). I personally obtained the chief complaint(s) and history of present illness. I confirmed and edited as necessary the review of systems, past medical/surgical history, family history, social history, and examination findings as documented by others; and I examined the patient myself. I personally reviewed the relevant tests, images, and reports as documented above. I formulated and edited as necessary the assessment and plan " and discussed the findings and management plan with the patient.  -Rosalind Wasserman MD  Total time spent 2021 reviewing notes from [facility], reviewing imaging (MRI), evaluating patient, and counselin minutes.

## 2021-12-07 NOTE — PROVIDER NOTIFICATION
MD Notification    Notified Person: MD    Notified Person Name: Saqib     Notification Date/Time: 12/6/2021 & 19:35     Notification Interaction: amcom paging     Purpose of Notification: [facility] needing to speak with hospitalist regarding finding in MRI     Orders Received: pending     Comments:

## 2021-12-07 NOTE — PLAN OF CARE
Discharge    Patient discharged to home via own car   Care plan note:  patient up ad guerrero; L eye edema improving as well as improvement in double vision and black spot.  He has an appointment with [facility] at 10:30 this morning.  Up ad guerrero.  Slept poorly due to GERD last night.  Toradol IV given for pain prior to removal of saline lock and discharge.    Listed belongings gathered and given to patient (including from security/pharmacy). Yes  Care Plan and Patient education resolved: Yes  Prescriptions if needed, hard copies sent with patient  filled and given to patient  Medication Bin checked and emptied on discharge Yes  SW/care coordinator/charge RN aware of discharge: Yes

## 2021-12-07 NOTE — PLAN OF CARE
L orbital cellulitis. Mild L eye swelling. Double vision and small black spot in L eye - improving. Aox4. Ind. RDA. PRN x1 toradol, x1 tylenol, and x2 oxy. PIV SL. Q4h visual acuity tests. MRI completed. Expected discharge tomorrow.

## 2021-12-07 NOTE — PLAN OF CARE
Cognitive Concerns/ Orientation : Alert and oriented x 4   BEHAVIOR: Green  CIWA SCORE: N/A   ABNL VS/O2: VSS on RA  MOBILITY: Independent  PAIN MANAGMENT: Complains of left eye discomfort, Toradol and oxycodone PRN   DIET: Regular  BOWEL/BLADDER: Continent, up to BR   ABNL LAB/BG: N/A  DRAIN/DEVICES: Peripheral IV right arm SL with intermittent antibiotics   TELEMETRY RHYTHM: N/A  SKIN: L eye redness and swelling which appears to be improving   TESTS/PROCEDURES:  MRI  D/C DAY/GOALS/PLACE:  pending MRI results; follow up with opthalmalogy  OTHER IMPORTANT INFO: Q4h neuro checks of visual acuity and color unchanged, pt experiencing double vision.  Pt reports L eye pain when looking side to side and up/down. Has black visual area at the 11 o'clock in left eye has slightly improved since admission.  On IV unasyn and vanco and prednisone

## 2021-12-07 NOTE — DISCHARGE SUMMARY
"[facility]  Hospitalist Discharge Summary      Date of Admission:  12/3/2021  Date of Discharge:  12/7/2021  Discharging Provider: Janis Brady PA-C      Discharge Diagnoses   Left orbital cellulitis  Elevated blood pressure, steroid induced    Follow-ups Needed After Discharge   Follow-up Appointments     Follow-up and recommended labs and tests       Follow up with primary care provider, Jasmeet Luna, within 7 days for   hospital follow- up.    Follow up with Ophthalmologist Oculoplastic surgeon Dr. Rosalind Aparicio   today at 10:30am at [address] [place] [address].             Discharge Disposition   Discharged to home  Condition at discharge: Stable    Hospital Course   Chad Bolivar is a 51 year old male admitted on 12/3/2021.  Past history of GERD who presents with headache, left orbital pain and swelling, found to have left orbital cellulitis. For full HPI please see admission H&P from Dr. Bradford Villa dated 12/3/21.     Left orbital cellulitis  Presents with above symptoms. Reporting area of vision loss in left visual field as well as diplopia. Afebrile without leukocytosis though exam consistent with periorbital cellulitis.   * CT orbit consistent with preseptal and postseptal left orbital cellulitis.   Treated with Unasyn + vancomycin during hospitalization. Started on steroids 12/5 due to minimal to no improvement to vision and pain. There was some concern of the possibility of orbital myocytes. 12/6 reported slight improvement to the area of vision loss (size of a fist) in the left visual field at 11' feeling it has \"lightened up\".   Ophthalmology has been following remotely and were gracious enough to have open and frequent communication with the Hospitalist service on the plan of care for this patient.  * Orbital MRI w/wo contrast results noted, there are nonspecific findings and differential includes infectious post-septal orbital cellulitis or " noninfectious inflammatory/immune-mediated conditions such as IgG-4 disease, sarcoidosis, idiopathic orbital inflammation, or Patti-Hunt syndrome (given the asymmetric enhancement of the left cavernous sinus region). Follow-up is recommended.    Discussed with the ophthalmology team and he was cleared for discharge on course of Augmentin and steroid taper. ID consulted and agreed with Augmentin, MRSA swab checked and negative therefore no need for additional Bactrim. Prescribed eye drops and 3 days of oxycodone, may use APAP at home. Patient was set up for appt with oculoplastic surgeon day of discharge. Day of discharge the patient reported decrease in pain. Visual changes unchanged.     12/6/21          12/3/21               Elevated blood pressure  Noted history of hypertension but not currently on antihypertensives. Suspect steroid induced, monitor in the outpatient setting and follow up with PCP.     GERD: Continue PTA PPI      Consultations This Hospital Stay   PHARMACY TO DOSE VANCO  INFECTIOUS DISEASES IP CONSULT    Code Status   Full Code    Time Spent on this Encounter   I, Janis Brady PA-C, personally saw the patient today and spent greater than 30 minutes discharging this patient.       Janis Brady PA-C  [place] [address] [address]  [facility] [address]  [place] [address]  Phone: [phone]  ______________________________________________________________________    Physical Exam   Vital Signs: Temp: 98  F (36.7  C) Temp src: Oral BP: 122/66 Pulse: 57   Resp: 18 SpO2: 94 % O2 Device: None (Room air)    Weight: 195 lbs 0 oz    General: Awake, alert, very pleasant middle aged gentleman who appears stated age. Looks comfortable sitting up in bed. No acute distress.  HEENT: Normocephalic, atraumatic. Extraocular movements intact. Pupils equal round and reactive to light bilaterally. Minimal to no edema and erythema to Lt periorbital region. No drainage. No open  areas.  Respiratory: Clear to auscultation bilaterally, no rales, wheezing, or rhonchi.  Cardiovascular: Regular rate and rhythm, +S1 and S2, no murmur auscultated. No peripheral edema.   Gastrointestinal: Soft, non-tender, non-distended. Bowel sounds present.  Skin: Warm, dry. No obvious rashes or lesions on exposed skin. Dorsalis pedis pulses palpable bilaterally.  Musculoskeletal: No joint swelling, erythema or tenderness. Moves all extremities equally.  Neurologic: AAO x3. Cranial nerves 2-12 grossly intact, normal strength and sensation.  Psychiatric: Appropriate mood and affect. No obvious anxiety or depression.       Primary Care Physician   Jasmeet Luna    Discharge Orders      Reason for your hospital stay    You were admitted with diplopia, eye pain and swelling and found to have orbital cellulitis. You were seen by infectious disease and ophthalmology was remotely consulted to assist in your care.     Activity    Your activity upon discharge: activity as tolerated     Follow-up and recommended labs and tests     Follow up with primary care provider, Jasmeet Luna, within 7 days for hospital follow- up.    Follow up with Ophthalmologist Oculoplastic surgeon Dr. Rosalind Aparicio today at 10:30am at [address] [place] [address].     Diet    Follow this diet upon discharge: Orders Placed This Encounter      Combination Diet Regular Diet Adult       Significant Results and Procedures   Results for orders placed or performed during the hospital encounter of 12/03/21   CT Orbital w Contrast    Narrative    CT SCAN OF THE ORBITS AND FACE WITH CONTRAST   12/3/2021 12:08 PM     HISTORY: Orbital cellulitis.    TECHNIQUE: CT images of the orbits were obtained following  administration of 50mL Isovue-370 IV. Radiation dose for this scan was  reduced using automated exposure control, adjustment of the mA and/or  kV according to patient size, or iterative reconstruction technique.     COMPARISON:  None.    FINDINGS:     Left orbit: There is asymmetric skin thickening and subcutaneous  stranding in the preseptal soft tissues. Inflammatory fat stranding is  noted within the inferior postseptal extraconal and intraconal fat  surrounding the inferior rectus muscle (series 6 image 57, series 3  image 20). No intraorbital or periorbital fluid collection is  identified. There may be mild asymmetric thickening of the left  inferior rectus muscle, which could be reactive to the adjacent  inflammatory changes or related to myositis. No intraorbital or  periorbital fluid collection is identified. No evidence for  subperiosteal abscess. The cavernous sinuses appear to enhance  symmetrically.    Right orbit: The ocular globe, optic nerve/sheath complex, extraocular  muscles, orbital fat, lacrimal glands, and bony orbit appear  unremarkable.    Multiple dental restorations of the maxillary teeth. Trace mucosal  thickening in the visualized aspects of the paranasal sinuses without  air-fluid levels. Mastoid and middle ear cavities are clear.  Degenerative changes in the upper cervical spine are noted.      Impression    IMPRESSION:  1. Findings that appear most consistent with preseptal and postseptal  left orbital cellulitis (Harpreet classification stage II), as  described.  2. Unremarkable right orbit.    KAYLA MALONE MD         SYSTEM ID:  [id number]   MR Orbit w/o & w Contrast    Narrative    EXAM: MRI OF THE ORBITS WITHOUT AND WITH CONTRAST    COMPARISON: CT of the orbits 12/03/2021.    INDICATION: Diplopia, orbital cellulitis, orbital pain.    TECHNIQUE: Multisequence, multiplanar MR imaging of the orbits without and with contrast was performed.    CONTRAST: 9 mL Gadavist.    FINDINGS:  RIGHT ORBIT: The ocular globe, optic nerve/sheath complex, extraocular muscles, orbital fat, lacrimal gland, and bony orbit appear unremarkable.    LEFT ORBIT: There is T2/STIR hyperintense signal and enhancement involving the left  inferior rectus muscle, which demonstrates mild asymmetric enlargement. There is amorphous T2/STIR hyperintense signal and enhancement involving the adjacent post septal   intraconal and extraconal soft tissues, primarily immediately surrounding the left inferior rectus muscle. There may be subtle amorphous enhancement along the left optic nerve sheath (for example see series 14 image 12) and elsewhere within the   intraconal fat. Possible mild asymmetric abnormal enhancement extending into the region of the left orbital apex. There also appears to be some degree of asymmetry of enhancement involving the left cavernous sinus (for example see series 15 image 11) and   also extending along the anteromedial aspect of the left middle cranial fossa (series 13 image 6). The ocular globe appears unremarkable. The other extraocular muscles appear within normal limits. The lacrimal gland and bony orbit appear normal.      Impression    IMPRESSION:  LEFT ORBIT:  Asymmetric mild enlargement of the left inferior rectus muscle with abnormal signal and enhancement. There is surrounding amorphous T2/FLAIR hyperintense signal and enhancement in the adjacent post-septal intraconal and extraconal fat. Suggestion of mild   asymmetric enhancement along the left optic nerve sheath. There is also subtle asymmetric enhancement involving the region of the left cavernous sinus and along the anteromedial aspect of the left middle cranial fossa. The findings are nonspecific.   Primary differential considerations would include infectious post-septal orbital cellulitis or noninfectious inflammatory/immune-mediated conditions such as IgG-4 disease, sarcoidosis, idiopathic orbital inflammation, or Patti-Hunt syndrome (given the   asymmetric enhancement of the left cavernous sinus region). Follow-up is recommended.    RIGHT ORBIT: Unremarkable.    The findings were discussed with Dr. Bradford Cerrato by myself at approximately 8:02 PM on 12/06/2021.        Discharge Medications   Current Discharge Medication List      START taking these medications    Details   amoxicillin-clavulanate (AUGMENTIN) 875-125 MG tablet Take 1 tablet by mouth 2 times daily for 10 days  Qty: 20 tablet, Refills: 0    Comments: Future refills by PCP Dr. Jasmeet Luna with phone number [phone].  Associated Diagnoses: Orbital cellulitis on left      carboxymethylcellulose PF (REFRESH PLUS) 0.5 % ophthalmic solution Place 1 drop into both eyes 4 times daily as needed for dry eyes  Qty: 1 each, Refills: 0    Comments: Future refills by PCP Dr. Jasmeet Luna with phone number [phone].  Associated Diagnoses: Orbital cellulitis on left      oxyCODONE (ROXICODONE) 5 MG tablet Take 1 tablet (5 mg) by mouth every 6 hours as needed for moderate to severe pain  Qty: 12 tablet, Refills: 0    Comments: Future refills by PCP Dr. Jasmeet Luna with phone number [phone].  Associated Diagnoses: Orbital cellulitis on left      predniSONE (DELTASONE) 10 MG tablet Take 6 tablets (60 mg) by mouth daily for 3 days, THEN 4 tablets (40 mg) daily for 3 days, THEN 2 tablets (20 mg) daily for 3 days, THEN 1 tablet (10 mg) daily for 3 days. 4 tabs daily for 3 days, then 3 tabs daily for 3 days, then 2 tabs daily for 3 days, then 1 tab daily for 3 days, then stop  Qty: 39 tablet, Refills: 0    Comments: Future refills by PCP Dr. Jasmeet Luna with phone number [phone].  Associated Diagnoses: Orbital cellulitis on left         CONTINUE these medications which have NOT CHANGED    Details   omeprazole (PRILOSEC) 20 MG DR capsule Take 20 mg by mouth daily      tadalafil (CIALIS) 5 MG tablet Take 5 mg by mouth daily as needed      testosterone cypionate (DEPOTESTOSTERONE) 200 MG/ML injection Inject 200 mg into the muscle every 14 days           Allergies   No Known Allergies

## 2021-12-07 NOTE — LETTER
2021         RE:  :  MRN: Chad Bolivar  1970  [id number]     Dear Dr. Villa,    Thank you for asking me to see your patient, Chad Bolivar, for an oculoplastic   consultation.  My assessment and plan are below.  For further details, please see my attached clinic note.      Chief Complaint(s) and History of Present Illness(es)     Consult For     Laterality: left eye    Onset: months ago    Frequency: constantly    Course: gradually improving    Associated symptoms: flashes    Treatments tried: artificial tears    Pain scale: 2/10              Comments     Chad Bolivar is being seen today at the request of  Bradford Villa for   left orbital cellulitis. Pt states vision has started to improve, looking   up to left double vision. No pain.     Prednisone 60mg- tapering    JONNIE Cosme COT 10:33 AM 2021       Over , had URI, tested negative for covid. Now presents with 5 day history of left retroorbital pain. Next day noticed swelling under left eye, used warm compresses without improvement. The following day, he had worsened left periorbital edema. Was hosptialized over the weekend for left orbital cellulitis s/p 4 days of IV antibiotics without much improvement in exam. Was then started on PO prednisone 60mg for the past 2 days, and now notices significant improvement in binocular vertical diplopia, pain with EOMs, left retroorbital pain, and floaters with dark cloud in left superotemporal vision.     MRI orbits 21:  IMPRESSION:  LEFT ORBIT:  Asymmetric mild enlargement of the left inferior rectus muscle with abnormal signal and enhancement. There is surrounding amorphous T2/FLAIR hyperintense signal and enhancement in the adjacent post-septal intraconal and extraconal fat. Suggestion of   mild  asymmetric enhancement along the left optic nerve sheath. There is also subtle asymmetric enhancement involving the region of the left cavernous sinus and along the  anteromedial aspect of the left middle cranial fossa. The findings are nonspecific.   Primary differential considerations would include infectious post-septal orbital cellulitis or noninfectious inflammatory/immune-mediated conditions such as IgG-4 disease, sarcoidosis, idiopathic orbital inflammation, or Patti-Hunt syndrome (given the   asymmetric enhancement of the left cavernous sinus region). Follow-up is recommended.    RIGHT ORBIT: Unremarkable.      Assessment & Plan     Chad Bolivar is a 51 year old male with the following diagnoses:   Encounter Diagnoses   Name Primary?     Orbital myositis of left side Yes     Diplopia       - Binocular vertical diplopia in superolateral gaze, pain with EOMs, both of which are improving with PO steroids and PO abx  - MRI shows enhancement of left inferior rectus extending along left optic nerve sheath, left cavernous sinus, anteromedial left middle cranial fossa  - no APD, EOMs full  - ddx includes inflammatory etiology, orbital myositis, ADY, less likely orbital cellulitis  - continue 10 day course of PO augmentin  - continue steroid taper: 60mg x 3 days, 40mg x 3 days, 20mg x 3 days, 10mg x 3 days, then stop  - will order: ANCA, TSI, ESR, CRP, RF  - baseline OCT and VF today  - Will plan for DFE at follow up. Recommend DFE today given scotoma, but patient defers dilation given weather conditions and that he drove himself today.     Angela Freeman MD  Oculoplastics Fellow  Agree with above. New consult.    51 year old otherwise healthy male. 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 He also noted what he describes as an area of his vision superotemporally being gray, which has improved (nearly resolved) on steroids. He was initially treated at [facility] with antibiotics but progressively worsened. Good response to prednisone now day 3 of 60 mg.     CT pre steroids 12/3 obtained at [facility] reviewed, reveals enlarged inferior rectus with fat  "stranding which appears to be adjacent to the inferior rectus.     MRI obtained 12/6/2021, day 2 on 60 mg of prednisone, Personal review reveals enlarged left inferior rectus. Radiology read \"suggestion of mild asymmetric enhancement of left optic nerve sheath. There is also subtle enhancement involving the region of the left cavernous sinus...\" I agree there may be a bit of nerve sheath enhancement but it is quite subtle, and could be normal.    Given acute onset, pain, and quick response to steroids orbital myositis/IOIS is likely. Denies palpitations, weight changes, did have a URI several weeks ago with cough, but no chronic cough, hemoptysis, no sinus disorders, or epistaxis, no hematuria.     Will obtain orbital inflammatory labs. Given he has been on 60 mg of prednisone will hold on imaging the chest, but will consider in the future.   Orders Placed This Encounter   Procedures     Thyroid stimulating immunoglobulin     ANCA IgG by IFA with Reflex to Titer     Rheumatoid factor     ESR: Erythrocyte sedimentation rate     CRP, inflammation     Angiotensin converting enzyme     Lysozyme, serum     He refuses dilating drops today due to poor driving conditions.  OCT retinal nerve fiber layer reveals normal retinal nerve fiber layer and macula on the right, normal macula on the left, and retinal nerve fiber layer is borderline thin temporally.  Octopus visual field normal right eye MD -0.3, and left eye is -0.4 with non-specific changes.   Continue prednisone taper (see Dr. Freeman's note above).   F/u in ~4 weeks, sooner with vision changes.     Rosalind Wasserman MD     Attending Physician Attestation: Complete documentation of historical and exam elements from today's encounter can be found in the full encounter summary report (not reduplicated in this progress note). I personally obtained the chief complaint(s) and history of present illness. I confirmed and edited as necessary the review of systems, past " medical/surgical history, family history, social history, and examination findings as documented by others; and I examined the patient myself. I personally reviewed the relevant tests, images, and reports as documented above. I formulated and edited as necessary the assessment and plan and discussed the findings and management plan with the patient.  -Rosalind Wasserman MD  Total time spent 2021 reviewing notes from [facility], reviewing imaging (MRI), evaluating patient, and counselin minutes.           Again, thank you for allowing me to participate in the care of your patient.      Sincerely,    Rosalind Wasserman MD  Department of Ophthalmology and Visual Neurosciences  [facility]    CC: Jasmeet Luna MD  [address] [address]  Via Fax: [phone]     Bradford Villa MD  [address] [facility] [address]  Via In Basket     Margaux Foy MD  [id number] [facility] [id number]  Via In Basket

## 2021-12-08 LAB
ACE SERPL-CCNC: 16 U/L
ANCA AB PATTERN SER IF-IMP: NORMAL
C-ANCA TITR SER IF: NORMAL {TITER}
RHEUMATOID FACT SER NEPH-ACNC: 11 IU/ML

## 2021-12-09 LAB — LYSOZYME SERPL-MCNC: 1.07 UG/ML

## 2021-12-10 LAB — TSI SER-ACNC: <1 TSI INDEX

## 2022-02-19 ENCOUNTER — HEALTH MAINTENANCE LETTER (OUTPATIENT)
Age: 52
End: 2022-02-19

## 2022-10-22 ENCOUNTER — HEALTH MAINTENANCE LETTER (OUTPATIENT)
Age: 52
End: 2022-10-22

## 2023-04-01 ENCOUNTER — HEALTH MAINTENANCE LETTER (OUTPATIENT)
Age: 53
End: 2023-04-01

## 2024-06-02 ENCOUNTER — HEALTH MAINTENANCE LETTER (OUTPATIENT)
Age: 54
End: 2024-06-02